# Patient Record
Sex: MALE | Race: WHITE | Employment: UNEMPLOYED | URBAN - METROPOLITAN AREA
[De-identification: names, ages, dates, MRNs, and addresses within clinical notes are randomized per-mention and may not be internally consistent; named-entity substitution may affect disease eponyms.]

---

## 2017-01-09 ENCOUNTER — MYC REFILL (OUTPATIENT)
Dept: PEDIATRICS | Facility: CLINIC | Age: 52
End: 2017-01-09

## 2017-01-09 DIAGNOSIS — Q85.01 NEUROFIBROMATOSIS, TYPE 1 (H): ICD-10-CM

## 2017-01-09 RX ORDER — MORPHINE SULFATE 15 MG/1
15 TABLET, FILM COATED, EXTENDED RELEASE ORAL 2 TIMES DAILY
Qty: 60 TABLET | Refills: 0 | Status: SHIPPED | OUTPATIENT
Start: 2017-01-12 | End: 2017-02-08

## 2017-01-09 NOTE — TELEPHONE ENCOUNTER
rx printed. Do they  at desk downstairs or upstairs? Please send mychart.    Perla Balderas MD  Internal Medicine/Pediatrics

## 2017-01-09 NOTE — TELEPHONE ENCOUNTER
Message from MyChart:  Original authorizing provider: MD Jarrell Pruett would like a refill of the following medications:  morphine (MS CONTIN) 15 MG 12 hr tablet [Perla Balderas MD]    Preferred pharmacy: [facility]    Comment:  need a refill on my morphine will pick-up at clinic on Friday 1/13/2017 thank you Jarrell lindo

## 2017-01-09 NOTE — TELEPHONE ENCOUNTER
MS Contin 15 mg      Last Written Prescription Date:  12/12/16  Last Fill Quantity: 60,   # refills: 0  [place] [facility] [place] [facility] or  Health prescribing provider: 12/21/16  Future Office visit:       Routing refill request to provider for review/approval because:  Drug not on the [facility] or  Health refill protocol or controlled substance    Controlled Substance Refill Request for MS Contin 15 mg  Problem List Complete:  Yes   checked in past 6 months?  No, route to RN    checked today-01/09/17.    Patient will  at  on Friday.  ANGEL Yousif RN

## 2017-02-08 ENCOUNTER — MYC REFILL (OUTPATIENT)
Dept: PEDIATRICS | Facility: CLINIC | Age: 52
End: 2017-02-08

## 2017-02-08 DIAGNOSIS — Q85.01 NEUROFIBROMATOSIS, TYPE 1 (H): ICD-10-CM

## 2017-02-08 RX ORDER — MORPHINE SULFATE 15 MG/1
15 TABLET, FILM COATED, EXTENDED RELEASE ORAL 2 TIMES DAILY
Qty: 60 TABLET | Refills: 0 | Status: SHIPPED | OUTPATIENT
Start: 2017-02-11 | End: 2017-03-09

## 2017-02-08 NOTE — TELEPHONE ENCOUNTER
Message from MyChart:  Original authorizing provider: MD Jarrell Pruett would like a refill of the following medications:  morphine (MS CONTIN) 15 MG 12 hr tablet [Perla Balderas MD]    Preferred pharmacy: [facility]    Comment:  need refill on my morphine, will pick-up at clinic, e-mail when ready Thank you Jarrell Pepe

## 2017-02-08 NOTE — TELEPHONE ENCOUNTER
MS Contin 15 mg      Last Written Prescription Date:  01/12/17  Last Fill Quantity: 60,   # refills: 0  Last Office Visit with [facility] or  Health prescribing provider: 12/21/16  Future Office visit:       Routing refill request to provider for review/approval because:  Drug not on the [facility], [facility] or [facility] refill protocol or controlled substance  Controlled Substance Refill Request for MS Contin 15 mg  Problem List Complete:  Yes   checked in past 6 months?  Yes 10/11/16     Please send My chart message when RX is ready to  at the clinic.  ANGEL Yousif RN

## 2017-03-09 ENCOUNTER — MYC REFILL (OUTPATIENT)
Dept: PEDIATRICS | Facility: CLINIC | Age: 52
End: 2017-03-09

## 2017-03-09 DIAGNOSIS — Q85.01 NEUROFIBROMATOSIS, TYPE 1 (H): ICD-10-CM

## 2017-03-09 RX ORDER — MORPHINE SULFATE 15 MG/1
15 TABLET, FILM COATED, EXTENDED RELEASE ORAL 2 TIMES DAILY
Qty: 60 TABLET | Refills: 0 | Status: SHIPPED | OUTPATIENT
Start: 2017-03-09 | End: 2017-04-09

## 2017-03-09 NOTE — TELEPHONE ENCOUNTER
MS contin      Last Written Prescription Date:  2-11-17  Last Fill Quantity: 60,   # refills: 0  [facility] provider: 12-21-16  Future Office visit:       Routing refill request to provider for review/approval because:  Drug not on the [facility] or [facility] refill protocol or controlled substance  Maximum use per month: 60  Expected duration: ongoing   Narcotic agreement on file: YES  Clinic visit recommended: Q 6 months  Ariadne Geronimo RN

## 2017-03-09 NOTE — TELEPHONE ENCOUNTER
Message from MyChart:  Original authorizing provider: MD Jarrell Pruett would like a refill of the following medications:  morphine (MS CONTIN) 15 MG 12 hr tablet [Perla Balderas MD]    Preferred pharmacy: [facility]    Comment:  need a refill on my morphine will pick-up at clinic when ready, e-mail me Thank you Jarrell Pepe

## 2017-04-09 ENCOUNTER — MYC REFILL (OUTPATIENT)
Dept: PEDIATRICS | Facility: CLINIC | Age: 52
End: 2017-04-09

## 2017-04-09 DIAGNOSIS — Q85.01 NEUROFIBROMATOSIS, TYPE 1 (H): ICD-10-CM

## 2017-04-10 RX ORDER — MORPHINE SULFATE 15 MG/1
15 TABLET, FILM COATED, EXTENDED RELEASE ORAL 2 TIMES DAILY
Qty: 60 TABLET | Refills: 0 | Status: SHIPPED | OUTPATIENT
Start: 2017-04-10 | End: 2017-05-08

## 2017-04-10 NOTE — TELEPHONE ENCOUNTER
Message from [employer]:  Original authorizing provider: MD Jarrell Courtney would like a refill of the following medications:  morphine (MS CONTIN) 15 MG 12 hr tablet [Shruthi Estrada MD]    Preferred pharmacy: [facility]    Comment:  Need a refill on my morphine will pick-up at clinic when ready. Thank you, Jarrell Pepe

## 2017-04-10 NOTE — TELEPHONE ENCOUNTER
Refill request for: MS CONTIN 15 MG BID    Last rx written: 3/9/17 # 60    **[place]  reviewed- last filled: 3/10/17  Narc agreement: #60 for 30 days  Last OV: 12/21/16  for Chronic Pain Syndrome    Unable to fill per standing order routed to preceptor for approval.    Place RX up front. Please send a Modify message when this has been done.     Problem List Complete:  Yes, please consider updating the dotphrase.   checked in past 6 months?  Yes 4/10/17    Oliva Mcguire RN

## 2017-05-08 ENCOUNTER — MYC REFILL (OUTPATIENT)
Dept: PEDIATRICS | Facility: CLINIC | Age: 52
End: 2017-05-08

## 2017-05-08 DIAGNOSIS — Q85.01 NEUROFIBROMATOSIS, TYPE 1 (H): ICD-10-CM

## 2017-05-08 RX ORDER — MORPHINE SULFATE 15 MG/1
15 TABLET, FILM COATED, EXTENDED RELEASE ORAL 2 TIMES DAILY
Qty: 60 TABLET | Refills: 0 | Status: SHIPPED | OUTPATIENT
Start: 2017-05-11 | End: 2017-06-07

## 2017-05-08 NOTE — TELEPHONE ENCOUNTER
[facility]  Original authorizing provider: MD Jarrell Courtney would like a refill of the following medications:  morphine (MS CONTIN) 15 MG 12 hr tablet [Shruthi Estrada MD]    Preferred pharmacy: [facility]    Comment:  need refill on my morphine will  at clinic, e-mail me when ready. thank you Jarrell Pepe

## 2017-05-08 NOTE — TELEPHONE ENCOUNTER
Due 5/12/17  Refill request for: MS CONTIN 15 MG BID    Last rx written: 4/10/17 # 60    **[place]  reviewed- last filled: 4/12/17  Narc agreement: #60 for 30 days  Last OV: 12/21/16  for Chronic Pain Syndrome    Unable to fill per standing order routed to preceptor for approval.    Place RX up front. Please send a Gyft message when this has been done.     Problem List Complete:  Yes, please consider updating the dotphrase.   checked in past 6 months?  Yes 5/8/17    Oliva Mcguire RN

## 2017-06-07 ENCOUNTER — MYC REFILL (OUTPATIENT)
Dept: PEDIATRICS | Facility: CLINIC | Age: 52
End: 2017-06-07

## 2017-06-07 DIAGNOSIS — Q85.01 NEUROFIBROMATOSIS, TYPE 1 (H): ICD-10-CM

## 2017-06-07 RX ORDER — MORPHINE SULFATE 15 MG/1
15 TABLET, FILM COATED, EXTENDED RELEASE ORAL 2 TIMES DAILY
Qty: 60 TABLET | Refills: 0 | Status: SHIPPED | OUTPATIENT
Start: 2017-06-11 | End: 2017-07-10

## 2017-06-07 NOTE — TELEPHONE ENCOUNTER
Message from MyChart:  Original authorizing provider: MD Jarrell Pruett would like a refill of the following medications:  morphine (MS CONTIN) 15 MG 12 hr tablet [Perla Balderas MD]    Preferred pharmacy: [facility]    Comment:  need re-fill on my morphine email me when ready, will pick-up at clinic. Thank you Jarrell Pepe

## 2017-06-07 NOTE — TELEPHONE ENCOUNTER
Due 6/11/17, changed start date for ok to fill on 6/9/17.   Refill request for: MS CONTIN 15 MG BID    Last rx written: 5/11/17 # 60    **[place]  reviewed- last filled: 5/11/17  Narc agreement: #60 for 30 days  Last OV: 12/21/16  for chronic pain    Unable to fill per standing order routed to Dr. Morfin for approval.    Place RX up front. Please send a [employer] message when this has been done.     Problem List Complete:  Yes, consider updating to chronic pain dotphrase   checked in past 6 months?  Yes 6/7/17    Oliva Mcguire, RN

## 2017-06-14 ENCOUNTER — OFFICE VISIT (OUTPATIENT)
Dept: PEDIATRICS | Facility: CLINIC | Age: 52
End: 2017-06-14
Payer: MEDICARE

## 2017-06-14 VITALS
SYSTOLIC BLOOD PRESSURE: 100 MMHG | HEIGHT: 60 IN | HEART RATE: 77 BPM | BODY MASS INDEX: 21.6 KG/M2 | TEMPERATURE: 99 F | DIASTOLIC BLOOD PRESSURE: 68 MMHG | WEIGHT: 110 LBS

## 2017-06-14 DIAGNOSIS — Z11.59 NEED FOR HEPATITIS C SCREENING TEST: ICD-10-CM

## 2017-06-14 DIAGNOSIS — G89.29 OTHER CHRONIC PAIN: ICD-10-CM

## 2017-06-14 DIAGNOSIS — E55.9 VITAMIN D DEFICIENCY: ICD-10-CM

## 2017-06-14 DIAGNOSIS — Z13.220 LIPID SCREENING: Primary | ICD-10-CM

## 2017-06-14 LAB
AMPHETAMINES UR QL: ABNORMAL NG/ML
BARBITURATES UR QL SCN: ABNORMAL NG/ML
BENZODIAZ UR QL SCN: ABNORMAL NG/ML
BUPRENORPHINE UR QL: ABNORMAL NG/ML
CANNABINOIDS UR QL: ABNORMAL NG/ML
COCAINE UR QL SCN: ABNORMAL NG/ML
D-METHAMPHET UR QL: ABNORMAL NG/ML
METHADONE UR QL SCN: ABNORMAL NG/ML
OPIATES UR QL SCN: ABNORMAL NG/ML
OXYCODONE UR QL SCN: ABNORMAL NG/ML
PCP UR QL SCN: ABNORMAL NG/ML
PROPOXYPH UR QL: ABNORMAL NG/ML
TRICYCLICS UR QL SCN: ABNORMAL NG/ML

## 2017-06-14 PROCEDURE — 99213 OFFICE O/P EST LOW 20 MIN: CPT | Mod: GE | Performed by: INTERNAL MEDICINE

## 2017-06-14 PROCEDURE — 80306 DRUG TEST PRSMV INSTRMNT: CPT | Performed by: INTERNAL MEDICINE

## 2017-06-14 NOTE — LETTER
[address]  [address]  [facility] [address]  [phone]      June 14, 2017      To Whom it may concern:    Jarrell Pepe has a medical condition that would benefit from increased activity taking care of a dog and I would hope an exception could be made to allow him to have a dog in the apartment.       Sincerely,    Tommie Mccray MD

## 2017-06-14 NOTE — NURSING NOTE
"No chief complaint on file.      Initial /68 (BP Location: Right arm, Patient Position: Chair, Cuff Size: Adult Regular)  Pulse 77  Temp 99  F (37.2  C) (Oral)  Ht 4' 11\" (1.499 m)  Wt 110 lb (49.9 kg)  BMI 22.22 kg/m2 Estimated body mass index is 22.22 kg/(m^2) as calculated from the following:    Height as of this encounter: 4' 11\" (1.499 m).    Weight as of this encounter: 110 lb (49.9 kg).  Medication Reconciliation: complete   Amarilis Brown MA      "

## 2017-06-14 NOTE — MR AVS SNAPSHOT
After Visit Summary   6/14/2017    Jarrell Pepe    MRN: [id number]           Patient Information     Date Of Birth          1965        Visit Information        Provider Department      6/14/2017 8:00 AM Tommie Torres MD [facility]        Today's Diagnoses     Lipid screening    -  1    Need for hepatitis C screening test        Vitamin D deficiency        Other chronic pain          Care Instructions    1. Follow up in 6 months.   2. Blood labs at that appointment. Urine today  3. If you have any issues you can email me.           Follow-ups after your visit        Future tests that were ordered for you today     Open Future Orders        Priority Expected Expires Ordered    Vitamin D Deficiency Routine 6/14/2018 6/14/2018 6/14/2017    Lipid panel reflex to direct LDL Routine  12/15/2017 6/14/2017    Hepatitis C antibody Routine 6/14/2018 6/14/2018 6/14/2017            Who to contact     If you have questions or need follow up information about today's clinic visit or your schedule please contact [facility] directly at [phone].  Normal or non-critical lab and imaging results will be communicated to you by Sanerahart, letter or phone within 4 business days after the clinic has received the results. If you do not hear from us within 7 days, please contact the clinic through ScaleMPt or phone. If you have a critical or abnormal lab result, we will notify you by phone as soon as possible.  Submit refill requests through YouBeauty or call your pharmacy and they will forward the refill request to us. Please allow 3 business days for your refill to be completed.          Additional Information About Your Visit        Sanerahart Information     YouBeauty gives you secure access to your electronic health record. If you see a primary care provider, you can also send messages to your care team and make appointments. If you have questions, please call your primary care  "clinic.  If you do not have a primary care provider, please call [phone] and they will assist you.        Care EveryWhere ID     This is your Care EveryWhere ID. This could be used by other organizations to access your [place] medical records  [id number]        Your Vitals Were     Pulse Temperature Height BMI (Body Mass Index)          77 99  F (37.2  C) (Oral) 4' 11\" (1.499 m) 22.22 kg/m2         Blood Pressure from Last 3 Encounters:   06/14/17 100/68   12/21/16 100/66   08/22/16 106/70    Weight from Last 3 Encounters:   06/14/17 110 lb (49.9 kg)   12/21/16 108 lb (49 kg)   08/22/16 105 lb (47.6 kg)              We Performed the Following     Drug Abuse Screen Panel 13, Urine (Pain Care Package)        Primary Care Provider Office Phone # Fax #    Tommie Torres [phone]       [facility] [address] [place] [address] [address] [place] [address]        Thank you!     Thank you for choosing [facility]  for your care. Our goal is always to provide you with excellent care. Hearing back from our patients is one way we can continue to improve our services. Please take a few minutes to complete the written survey that you may receive in the mail after your visit with us. Thank you!             Your Updated Medication List - Protect others around you: Learn how to safely use, store and throw away your medicines at www.disposemymeds.org.          This list is accurate as of: 6/14/17  8:41 AM.  Always use your most recent med list.                   Brand Name Dispense Instructions for use    cholecalciferol 1000 UNIT tablet    vitamin D    100 tablet    Take 1 tablet (1,000 Units) by mouth daily       COLACE 100 MG capsule   Generic drug:  docusate sodium      Take 100 mg by mouth daily       morphine 15 MG 12 hr tablet    MS CONTIN    60 tablet    Take 1 tablet (15 mg) by mouth 2 times daily         "

## 2017-06-14 NOTE — PROGRESS NOTES
SUBJECTIVE:                                                    Jarrell Pepe is a 51 year old male who presents to clinic today for the following health issues:      Medication Followup of  Morphine    Taking Medication as prescribed: yes    Side Effects:  None    Medication Helping Symptoms:  yes     He is doing quite well. His pain is well controlled and he is able to get out of the house which he was unable to do prior to initiation of MS Contin. No drug use, no alcohol use. No constipation, itching, other side effects. Does not need a refill today. We will get a UDS. He does not have any concerning neurofibromas that are causing him a lot of pain or discomfort. Preventative care is up to date other than UDS and hepatitis C.     Problem list and histories reviewed & adjusted, as indicated.  Additional history: as documented    Patient Active Problem List   Diagnosis     Dental caries     Neurofibromatosis, type 1 (H)     Scoliosis associated with other condition     Back pain     Hypertension     Pain of lumbar spine     Cervical spine degeneration     Vitamin D deficiency     Cervical spondylosis with myelopathy     Kyphoscoliosis deformity of spine     Chronic low back pain     Chronic pain     CARDIOVASCULAR SCREENING; LDL GOAL LESS THAN 160     Past Surgical History:   Procedure Laterality Date     COLONOSCOPY N/A 8/22/2016    Procedure: COMBINED COLONOSCOPY, SINGLE OR MULTIPLE BIOPSY/POLYPECTOMY BY BIOPSY;  Surgeon: Mariana Araujo MD;  Location:  GI     ENT SURGERY      tonsil and adnoids 1970's       Social History   Substance Use Topics     Smoking status: Former Smoker     Packs/day: 1.00     Years: 20.00     Types: Cigarettes     Quit date: 6/1/2013     Smokeless tobacco: Never Used     Alcohol use No     History reviewed. No pertinent family history.        Reviewed and updated as needed this visit by clinical staff  Tobacco  Allergies  Meds  Med Hx  Surg Hx  Fam Hx  Soc Hx     "    ROS:  Constitutional, HEENT, cardiovascular, pulmonary, gi and gu systems are negative, except as otherwise noted.    OBJECTIVE:                                                    /68 (BP Location: Right arm, Patient Position: Chair, Cuff Size: Adult Regular)  Pulse 77  Temp 99  F (37.2  C) (Oral)  Ht 4' 11\" (1.499 m)  Wt 110 lb (49.9 kg)  BMI 22.22 kg/m2  Body mass index is 22.22 kg/(m^2).  GENERAL: healthy, alert and no distress  RESP: lungs clear to auscultation - no rales, rhonchi or wheezes  CV: regular rate and rhythm, normal S1 S2, no S3 or S4, no murmur, click or rub, no peripheral edema and peripheral pulses strong  MS: no gross musculoskeletal defects noted, no edema  SKIN: innumerous scattered cutaneous neurofibromas, none bleeding or draining.     Diagnostic Test Results:  UDS pending      ASSESSMENT/PLAN:                                                    1. Lipid screening  2. Need for hepatitis C screening test  3. Vitamin D deficiency  Will check labs this winter with next appointment in about 6 months.   - Lipid panel reflex to direct LDL; Future  - Hepatitis C antibody; Future  - Vitamin D Deficiency; Future    4. Other chronic pain  Chronic back pain secondary to scoliosis from his NF1. Has been on stable dose of MSContin. Some risk for CKD with NF and have been intermittently checking his renal function while on MSContin. Will check Utox today.   - Drug Abuse Screen Panel 13, Urine (Pain Care Package)    See Patient Instructions. He will follow up with Dr. Perez going forward and they were introduced today.     Tommie Mccray MD  [facility]    I have discussed the patient with Dr. Mccray and agree with the jointly developed plan as documented above.    Bri Henderson MD  Internal Medicine-Pediatrics      "

## 2017-07-10 ENCOUNTER — MYC REFILL (OUTPATIENT)
Dept: PEDIATRICS | Facility: CLINIC | Age: 52
End: 2017-07-10

## 2017-07-10 DIAGNOSIS — Q85.01 NEUROFIBROMATOSIS, TYPE 1 (H): ICD-10-CM

## 2017-07-10 NOTE — TELEPHONE ENCOUNTER
Please inform patient when prescription is ready for  at [facility].    Morphine      Last Written Prescription Date:  6/11/2017  Last Fill Quantity: 60,   # refills: 0  Last Office Visit with [facility] or [facility] provider: 6/14/2017  Future Office visit:       Routing refill request to provider for review/approval because:  Drug not on the [facility], [facility] or [facility] refill protocol or controlled substance. Routing to last prescribing MD. Please advise if refill is still appropriate.       RX [place] reviewed:  reviewed- no concerns    Last fills:    6/11/2017, #60  5/11/2017, #60  4/12/2017, #60    [place]:  [email or web address]      Pema RAIN RN, BSN, PHN  [employer] RN

## 2017-07-10 NOTE — TELEPHONE ENCOUNTER
Message from [email or web address]:  Original authorizing provider: MD Jarrell Courtney would like a refill of the following medications:  morphine (MS CONTIN) 15 MG 12 hr tablet [Shruthi Estrada MD]    Preferred pharmacy: [facility]    Comment:  need refill on my morphine will pick-up at clinic, e-mail me when ready. Thank you , Jarrell Pepe

## 2017-07-11 RX ORDER — MORPHINE SULFATE 15 MG/1
15 TABLET, FILM COATED, EXTENDED RELEASE ORAL 2 TIMES DAILY
Qty: 60 TABLET | Refills: 0 | Status: SHIPPED | OUTPATIENT
Start: 2017-07-11 | End: 2017-08-07

## 2017-09-05 ENCOUNTER — MYC REFILL (OUTPATIENT)
Dept: PEDIATRICS | Facility: CLINIC | Age: 52
End: 2017-09-05

## 2017-09-05 DIAGNOSIS — Q85.01 NEUROFIBROMATOSIS, TYPE 1 (H): ICD-10-CM

## 2017-09-08 ENCOUNTER — MYC REFILL (OUTPATIENT)
Dept: PEDIATRICS | Facility: CLINIC | Age: 52
End: 2017-09-08

## 2017-09-08 DIAGNOSIS — Q85.01 NEUROFIBROMATOSIS, TYPE 1 (H): ICD-10-CM

## 2017-09-08 RX ORDER — MORPHINE SULFATE 15 MG/1
15 TABLET, FILM COATED, EXTENDED RELEASE ORAL 2 TIMES DAILY
Qty: 60 TABLET | Refills: 0 | Status: CANCELLED | OUTPATIENT
Start: 2017-09-08

## 2017-09-08 NOTE — TELEPHONE ENCOUNTER
Message from [employer]:  Original authorizing provider: MD Jarrell Courtney would like a refill of the following medications:  morphine (MS CONTIN) 15 MG 12 hr tablet [Shruthi Estrada MD]    Preferred pharmacy: [facility]    Comment:  need re-fill on my morphine e-mail me when ready to  at clinic thank you Jarrell Pepe

## 2017-09-08 NOTE — TELEPHONE ENCOUNTER
Please leave rx with [facility] when ready. Thanks.     MS Contin 15 mg bid      Last Written Prescription Date:  08/09/17  Last Fill Quantity: 60,   # refills: 0  Last Office Visit with [facility], [facility] or [facility] prescribing provider: 06/14/17  Future Office visit:       Routing refill request to provider for review/approval because:  Drug not on the [facility], [facility] or  [place] refill protocol or controlled substance    Abraham, RN  Triage Nurse

## 2017-09-08 NOTE — TELEPHONE ENCOUNTER
Message from [employer]:  Original authorizing provider: MD Jarrell Courtney would like a refill of the following medications:  morphine (MS CONTIN) 15 MG 12 hr tablet [Shruthi Estrada MD]    Preferred pharmacy: [facility] REQUEST[facility]    Comment:  not sure if you got my e-mail, I need a refill on my morphine please e-mail me when ready at  thank you Jarrell Pepe

## 2017-09-11 RX ORDER — MORPHINE SULFATE 15 MG/1
15 TABLET, FILM COATED, EXTENDED RELEASE ORAL 2 TIMES DAILY
Qty: 60 TABLET | Refills: 0 | Status: SHIPPED | OUTPATIENT
Start: 2017-09-11 | End: 2017-10-05

## 2017-10-05 ENCOUNTER — MYC REFILL (OUTPATIENT)
Dept: PEDIATRICS | Facility: CLINIC | Age: 52
End: 2017-10-05

## 2017-10-05 DIAGNOSIS — Q85.01 NEUROFIBROMATOSIS, TYPE 1 (H): ICD-10-CM

## 2017-10-05 RX ORDER — MORPHINE SULFATE 15 MG/1
15 TABLET, FILM COATED, EXTENDED RELEASE ORAL 2 TIMES DAILY
Qty: 60 TABLET | Refills: 0 | Status: SHIPPED | OUTPATIENT
Start: 2017-10-07 | End: 2018-02-05

## 2017-10-05 NOTE — TELEPHONE ENCOUNTER
Refill request for: MS CONTIN 15 MG BID    Last rx written: 9/11/17 # 60    **[place]  reviewed- last filled: 9/11/17  Narc agreement: #60 for 30 days  Last OV: 6/14/17  for lipid screening, f/u plan in 6 months    Unable to fill per standing order routed to Dr. Estrada for approval.    Place RX up front. Please reply to this MyChart message when this has been done.     Problem List Complete:  Yes, consider updating dotphrase   checked in past 6 months?  Yes 10/5/17    Oliva Tovar RN

## 2017-10-05 NOTE — TELEPHONE ENCOUNTER
Message from [employer]:  Original authorizing provider: MD Jarrell Courtney would like a refill of the following medications:  morphine (MS CONTIN) 15 MG 12 hr tablet [Shruthi Estrada MD]    Preferred pharmacy: [facility] REQUESTED    Comment:  need a refill on my morphine, e-mail me when ready to  at  thank you. Jarrell Pepe

## 2017-10-26 ENCOUNTER — OFFICE VISIT (OUTPATIENT)
Dept: PEDIATRICS | Facility: CLINIC | Age: 52
End: 2017-10-26
Payer: MEDICARE

## 2017-10-26 VITALS
WEIGHT: 105 LBS | TEMPERATURE: 98.1 F | SYSTOLIC BLOOD PRESSURE: 100 MMHG | HEART RATE: 75 BPM | BODY MASS INDEX: 20.62 KG/M2 | HEIGHT: 60 IN | DIASTOLIC BLOOD PRESSURE: 60 MMHG

## 2017-10-26 DIAGNOSIS — Q85.01 NEUROFIBROMATOSIS, TYPE 1 (VON RECKLINGHAUSEN'S DISEASE) (H): Primary | ICD-10-CM

## 2017-10-26 DIAGNOSIS — M41.55 OTHER SECONDARY SCOLIOSIS, THORACOLUMBAR REGION: ICD-10-CM

## 2017-10-26 DIAGNOSIS — G89.29 OTHER CHRONIC PAIN: ICD-10-CM

## 2017-10-26 DIAGNOSIS — Z00.00 ROUTINE GENERAL MEDICAL EXAMINATION AT A HEALTH CARE FACILITY: ICD-10-CM

## 2017-10-26 LAB

## 2017-10-26 PROCEDURE — 99396 PREV VISIT EST AGE 40-64: CPT | Mod: GC | Performed by: STUDENT IN AN ORGANIZED HEALTH CARE EDUCATION/TRAINING PROGRAM

## 2017-10-26 PROCEDURE — 80306 DRUG TEST PRSMV INSTRMNT: CPT | Performed by: INTERNAL MEDICINE

## 2017-10-26 RX ORDER — MORPHINE SULFATE 15 MG/1
15 TABLET, FILM COATED, EXTENDED RELEASE ORAL EVERY 12 HOURS
Qty: 60 TABLET | Refills: 0 | Status: SHIPPED | OUTPATIENT
Start: 2017-11-07 | End: 2017-12-07

## 2017-10-26 NOTE — NURSING NOTE
"Chief Complaint   Patient presents with     Physical       Initial /60 (Cuff Size: Adult Regular)  Pulse 75  Temp 98.1  F (36.7  C) (Oral)  Ht 4' 11\" (1.499 m)  Wt 105 lb (47.6 kg)  BMI 21.21 kg/m2 Estimated body mass index is 21.21 kg/(m^2) as calculated from the following:    Height as of this encounter: 4' 11\" (1.499 m).    Weight as of this encounter: 105 lb (47.6 kg).  Medication Reconciliation: aleida Wagner CMA    "

## 2017-10-26 NOTE — PROGRESS NOTES
SUBJECTIVE:   CC: Jarrell Pepe is an 51 year old male who presents for preventative health visit.     Physical   Annual:     Getting at least 3 servings of Calcium per day::  Yes    Bi-annual eye exam::  NO    Dental care twice a year::  NO    Sleep apnea or symptoms of sleep apnea::  None    Diet::  Regular (no restrictions)    Frequency of exercise::  4-5 days/week    Duration of exercise::  15-30 minutes    Taking medications regularly::  Yes    Medication side effects::  None    Additional concerns today::  YES    Issues to address today:  1. 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 Feels he gets very good pain control and has been on stable dose for some time. Able to complete ADLs and do the activities he wants to do with pain relief. No problems with constipation and no side effects he has noticed from taking morphine. Does not take more than two a day. Next refill is due in 2 weeks. Last UDS positive only for opiates. Would like refill when due.    2. NF 1. Follows up every 1-2 years with Peds heme/onc. Next appointment scheduled for 12/2017. No active issues to follow up.       Today's PHQ-2 Score:   PHQ-2 ( 1999 [employer]) 10/23/2017   Q1: Little interest or pleasure in doing things 0   Q2: Feeling down, depressed or hopeless 0   PHQ-2 Score 0   Q1: Little interest or pleasure in doing things Not at all   Q2: Feeling down, depressed or hopeless Not at all   PHQ-2 Score 0       Abuse: Current or Past(Physical, Sexual or Emotional)- No  Do you feel safe in your environment - Yes    Social History   Substance Use Topics     Smoking status: Former Smoker     Packs/day: 1.00     Years: 20.00     Types: Cigarettes     Quit date: 6/1/2013     Smokeless tobacco: Never Used     Alcohol use No     The patient does not drink >3 drinks per day nor >7 drinks per week.    Last PSA: No results found for: PSA    Reviewed orders with patient. Reviewed health  maintenance and updated orders accordingly - Yes  Labs reviewed in EPIC  BP Readings from Last 3 Encounters:   10/26/17 100/60   06/14/17 100/68   12/21/16 100/66    Wt Readings from Last 3 Encounters:   10/26/17 105 lb (47.6 kg)   06/14/17 110 lb (49.9 kg)   12/21/16 108 lb (49 kg)                  Patient Active Problem List   Diagnosis     Dental caries     Neurofibromatosis, type 1 (H)     Scoliosis associated with other condition     Back pain     Hypertension     Pain of lumbar spine     Cervical spine degeneration     Vitamin D deficiency     Cervical spondylosis with myelopathy     Kyphoscoliosis deformity of spine     Chronic low back pain     Chronic pain     CARDIOVASCULAR SCREENING; LDL GOAL LESS THAN 160     Past Surgical History:   Procedure Laterality Date     COLONOSCOPY N/A 8/22/2016    Procedure: COMBINED COLONOSCOPY, SINGLE OR MULTIPLE BIOPSY/POLYPECTOMY BY BIOPSY;  Surgeon: Mariana Araujo MD;  Location:  GI     ENT SURGERY      tonsil and adnoids 1970's       Social History   Substance Use Topics     Smoking status: Former Smoker     Packs/day: 1.00     Years: 20.00     Types: Cigarettes     Quit date: 6/1/2013     Smokeless tobacco: Never Used     Alcohol use No     No family history on file.      Current Outpatient Prescriptions   Medication Sig Dispense Refill     [START ON 11/7/2017] morphine (MS CONTIN) 15 MG 12 hr tablet Take 1 tablet (15 mg) by mouth every 12 hours maximum 2 tablet(s) per day 60 tablet 0     morphine (MS CONTIN) 15 MG 12 hr tablet Take 1 tablet (15 mg) by mouth 2 times daily 60 tablet 0     cholecalciferol (VITAMIN D) 1000 UNIT tablet Take 1 tablet (1,000 Units) by mouth daily 100 tablet 3     docusate sodium (COLACE) 100 MG capsule Take 100 mg by mouth daily       Allergies   Allergen Reactions     Contrast Dye      Nkda [No Known Drug Allergies]            Reviewed and updated as needed this visit by clinical staffTobacco  Allergies  Meds         Reviewed and  "updated as needed this visit by Provider        Past Medical History:   Diagnosis Date     Neurofibromatosis, type 1 (H) 11/12/2013     Scoliosis associated with other condition 11/12/2013      Past Surgical History:   Procedure Laterality Date     COLONOSCOPY N/A 8/22/2016    Procedure: COMBINED COLONOSCOPY, SINGLE OR MULTIPLE BIOPSY/POLYPECTOMY BY BIOPSY;  Surgeon: Mariana Araujo MD;  Location:  GI     ENT SURGERY      tonsil and adnoids 1970's       Review of Systems  C: NEGATIVE for fever, chills, change in weight  I: POSITIVE for skin manifestations of NF  E: NEGATIVE for vision changes or irritation  ENT: NEGATIVE for ear, mouth and throat problems  R: NEGATIVE for significant cough or SOB  CV: NEGATIVE for chest pain, palpitations or peripheral edema  GI: NEGATIVE for nausea, abdominal pain, heartburn, or change in bowel habits   male: negative   M: POSITIVE for back pain  N: NEGATIVE for weakness, dizziness or paresthesias  P: NEGATIVE for changes in mood or affect    OBJECTIVE:   /60 (Cuff Size: Adult Regular)  Pulse 75  Temp 98.1  F (36.7  C) (Oral)  Ht 4' 11\" (1.499 m)  Wt 105 lb (47.6 kg)  BMI 21.21 kg/m2    Physical Exam    General: awake, alert, in no acute distress, very pleasant, thin  HEENT: NCAT, PERRL, EOMI, sclera anicteric, no nasal discharge, MMM, posterior pharynx without erythema or exudates, no cervical lymphadenopathy  CV: RRR, no murmurs noted, peripheral pulses strong  Resp: CTAB, no increased WOB  Abd: Soft, nontender, nondistended, +BS, no rebound or guarding  MSK: No peripheral edema, extremities warm and well perfused, normal pulses, significant scoliosis of back  Skin: warm, dry, no jaundice. Numerous diffuse neurofibromas.  Neuro: CN II-XII grossly intact. No focal deficits. Alert and oriented x4.      ASSESSMENT/PLAN:   1. Neurofibromatosis, type 1 (von Recklinghausen's disease) (H)  2. Other chronic pain  Chronic pain related to scoliosis and tumor from NF1. " "Stable on current dose of morphine.  - morphine (MS CONTIN) 15 MG 12 hr tablet; Take 1 tablet (15 mg) by mouth every 12 hours maximum 2 tablet(s) per day  Dispense: 60 tablet; Refill: 0 - Paper Rx given, forward dated for 11/7 for refill.   - F/up with Peds Heme/Onc as scheduled 12/2017  - Drug Abuse Screen Panel 13, Urine (Pain Care Package) - positive only for opiates as expected    3. Other secondary scoliosis, thoracolumbar region  - morphine (MS CONTIN) 15 MG 12 hr tablet; Take 1 tablet (15 mg) by mouth every 12 hours maximum 2 tablet(s) per day  Dispense: 60 tablet; Refill: 0    4. Routine general medical examination at a health care facility  - ZOSTER VACC LIVE SUBQ NJX - ordered, patient will go down to pharmacy and see if covered by Medicare as he is younger than age 60. If not covered, he will wait until he turns 60 to get the vaccine.  - Does not want flu shot today  - Basic metabolic panel; Future  Other labs ordered from last visit include lipid panel, Hep C, and vitamin D.   He is not fasting today so will wait until his next visit to get these labs and he will be fasting at that time.   - Otherwise UTD on colonoscopy (last 8/2016, needs repeat in 5 years)    COUNSELING:   Reviewed preventive health counseling, as reflected in patient instructions  Special attention given to:        Regular exercise       Healthy diet/nutrition       Immunizations    Would like vaccination for zoster but will wait until 60 if not covered by Medicare now.     Declined flu shot.         Consider Hep C screening for patients born between 1945 and 1965 - will get at next lab draw             reports that he quit smoking about 4 years ago. His smoking use included Cigarettes. He has a 20.00 pack-year smoking history. He has never used smokeless tobacco.      Estimated body mass index is 21.21 kg/(m^2) as calculated from the following:    Height as of this encounter: 4' 11\" (1.499 m).    Weight as of this encounter: 105 lb " (47.6 kg).         Counseling Resources:  ATP IV Guidelines  Pooled Cohorts Equation Calculator  FRAX Risk Assessment  ICSI Preventive Guidelines  Dietary Guidelines for Americans, 2010  USDA's MyPlate  ASA Prophylaxis  Lung CA Screening    F/up in clinic in 6 months.       Patient was seen and discussed with attending, Dr Ryne Rosenbaum, who agrees with the above assessment and plan.    Caroline Ames MD  PGY - 2   Internal Medicine/Pediatrics  Pager [phone]    ===========  STAFF NOTE:  Patient seen with resident physician today.  I was physically present during key portions of the visit and participated in the evaluation and management of the patient today.     Juanpablo Rosenbaum MD

## 2017-10-26 NOTE — MR AVS SNAPSHOT
After Visit Summary   10/26/2017    Jarrell Pepe    MRN: [id number]           Patient Information     Date Of Birth          1965        Visit Information        Provider Department      10/26/2017 8:30 AM Keon Ames MD [facility]        Today's Diagnoses     Neurofibromatosis, type 1 (von Recklinghausen's disease) (H)    -  1    Other chronic pain        Other secondary scoliosis, thoracolumbar region        Routine general medical examination at a health care facility          Care Instructions    1. Morphine prescription given starting 11/7/17. Urine drug test today.  2. Vaccines: shingles vaccine is recommend at age 60 but OK to get after the age of 50. May be covered by Medicare. I'll send you down to pharmacy to see if it is covered by your insurance and they can give it. If you change your mind about the flu shot we can give it any time!  3. Labs: you are due for getting your lipid panel, vitamin D, and hepatitis C checked. We will also check your kidney function. I'll send you down to lab for this and you can see the results in MyChart.  4. Your last colonoscopy was in August 2016 - repeat in 5 years.   4. Finally, your appointment with Peds Heme/Onc is 12/6/2017. I suggest you give them a call at  to find out where your appointment is with Oxana Chan NP.  5. We'll see you back in 6 months!    Preventive Health Recommendations  Male Ages 50 - 64    Yearly exam:             See your health care provider every year in order to  o   Review health changes.   o   Discuss preventive care.    o   Review your medicines if your doctor has prescribed any.     Have a cholesterol test every 5 years, or more frequently if you are at risk for high cholesterol/heart disease.     Have a diabetes test (fasting glucose) every three years. If you are at risk for diabetes, you should have this test more often.     Have a colonoscopy at age 50, or have a yearly FIT  test (stool test). These exams will check for colon cancer.      Talk with your health care provider about whether or not a prostate cancer screening test (PSA) is right for you.    You should be tested each year for STDs (sexually transmitted diseases), if you re at risk.     Shots: Get a flu shot each year. Get a tetanus shot every 10 years.     Nutrition:    Eat at least 5 servings of fruits and vegetables daily.     Eat whole-grain bread, whole-wheat pasta and brown rice instead of white grains and rice.     Talk to your provider about Calcium and Vitamin D.     Lifestyle    Exercise for at least 150 minutes a week (30 minutes a day, 5 days a week). This will help you control your weight and prevent disease.     Limit alcohol to one drink per day.     No smoking.     Wear sunscreen to prevent skin cancer.     See your dentist every six months for an exam and cleaning.     See your eye doctor every 1 to 2 years.            Follow-ups after your visit        Your next 10 appointments already scheduled     Dec 06, 2017  8:45 AM CST   Return Visit with DEEDEE Gamino CNP Hematology Oncology ([facility])    [facility]  9th Floor  [address] [address] [phone]              Who to contact     If you have questions or need follow up information about today's clinic visit or your schedule please contact [facility] directly at [phone].  Normal or non-critical lab and imaging results will be communicated to you by MyChart, letter or phone within 4 business days after the clinic has received the results. If you do not hear from us within 7 days, please contact the clinic through MyChart or phone. If you have a critical or abnormal lab result, we will notify you by phone as soon as possible.  Submit refill requests through [employer] or call your pharmacy and they will forward the refill request to us. Please allow 3 business days for your  "refill to be completed.          Additional Information About Your Visit        LabfolderharorderTalk Information     [employer] gives you secure access to your electronic health record. If you see a primary care provider, you can also send messages to your care team and make appointments. If you have questions, please call your primary care clinic.  If you do not have a primary care provider, please call [phone] and they will assist you.        Care EveryWhere ID     This is your Care EveryWhere ID. This could be used by other organizations to access your [facility] medical records  [id number]        Your Vitals Were     Pulse Temperature Height BMI (Body Mass Index)          75 98.1  F (36.7  C) (Oral) 4' 11\" (1.499 m) 21.21 kg/m2         Blood Pressure from Last 3 Encounters:   10/26/17 100/60   06/14/17 100/68   12/21/16 100/66    Weight from Last 3 Encounters:   10/26/17 105 lb (47.6 kg)   06/14/17 110 lb (49.9 kg)   12/21/16 108 lb (49 kg)              We Performed the Following     Basic metabolic panel  (Ca, Cl, CO2, Creat, Gluc, K, Na, BUN)     Drug Abuse Screen Panel 13, Urine (Pain Care Package)     ZOSTER VACC LIVE SUBQ NJX          Today's Medication Changes          These changes are accurate as of: 10/26/17  9:19 AM.  If you have any questions, ask your nurse or doctor.               These medicines have changed or have updated prescriptions.        Dose/Directions    * morphine 15 MG 12 hr tablet   Commonly known as:  MS CONTIN   This may have changed:  Another medication with the same name was added. Make sure you understand how and when to take each.   Used for:  Neurofibromatosis, type 1 (H)   Changed by:  Shruthi Estrada MD        Dose:  15 mg   Take 1 tablet (15 mg) by mouth 2 times daily   Quantity:  60 tablet   Refills:  0       * morphine 15 MG 12 hr tablet   Commonly known as:  MS CONTIN   This may have changed:  You were already taking a medication with the same name, and this prescription was " added. Make sure you understand how and when to take each.   Used for:  Neurofibromatosis, type 1 (von Recklinghausen's disease) (H), Other chronic pain, Other secondary scoliosis, thoracolumbar region   Changed by:  Keon Ames MD        Dose:  15 mg   Start taking on:  11/7/2017   Take 1 tablet (15 mg) by mouth every 12 hours maximum 2 tablet(s) per day   Quantity:  60 tablet   Refills:  0       * Notice:  This list has 2 medication(s) that are the same as other medications prescribed for you. Read the directions carefully, and ask your doctor or other care provider to review them with you.         Where to get your medicines      Some of these will need a paper prescription and others can be bought over the counter.  Ask your nurse if you have questions.     Bring a paper prescription for each of these medications     morphine 15 MG 12 hr tablet                Primary Care Provider Office Phone # Fax #    Neli Perez [phone]       [address] [address]        Equal Access to Services     [facility] : Hadii lynda ku hadasho Soomaali, waaxda luqadaha, qaybta kaalmada adeegyada, janie monzon . So [facility] [phone].    ATENCIÓN: Si habla español, tiene a martinez disposición servicios gratuitos de asistencia lingüística. Llame al [phone].    We comply with applicable federal civil rights laws and [place] laws. We do not discriminate on the basis of race, color, national origin, age, disability, sex, sexual orientation, or gender identity.            Thank you!     Thank you for choosing [facility]  for your care. Our goal is always to provide you with excellent care. Hearing back from our patients is one way we can continue to improve our services. Please take a few minutes to complete the written survey that you may receive in the mail after your visit with us. Thank you!             Your Updated  Medication List - Protect others around you: Learn how to safely use, store and throw away your medicines at www.disposemymeds.org.          This list is accurate as of: 10/26/17  9:19 AM.  Always use your most recent med list.                   Brand Name Dispense Instructions for use Diagnosis    cholecalciferol 1000 UNIT tablet    vitamin D3    100 tablet    Take 1 tablet (1,000 Units) by mouth daily    Vitamin D deficiency       COLACE 100 MG capsule   Generic drug:  docusate sodium      Take 100 mg by mouth daily        * morphine 15 MG 12 hr tablet    MS CONTIN    60 tablet    Take 1 tablet (15 mg) by mouth 2 times daily    Neurofibromatosis, type 1 (H)       * morphine 15 MG 12 hr tablet   Start taking on:  11/7/2017    MS CONTIN    60 tablet    Take 1 tablet (15 mg) by mouth every 12 hours maximum 2 tablet(s) per day    Neurofibromatosis, type 1 (von Recklinghausen's disease) (H), Other chronic pain, Other secondary scoliosis, thoracolumbar region       * Notice:  This list has 2 medication(s) that are the same as other medications prescribed for you. Read the directions carefully, and ask your doctor or other care provider to review them with you.

## 2017-10-26 NOTE — PATIENT INSTRUCTIONS
1. Morphine prescription given starting 11/7/17. Urine drug test today.  2. Vaccines: shingles vaccine is recommend at age 60 but OK to get after the age of 50. May be covered by Medicare. I'll send you down to pharmacy to see if it is covered by your insurance and they can give it. If you change your mind about the flu shot we can give it any time!  3. Labs: you are due for getting your lipid panel, vitamin D, and hepatitis C checked. We will also check your kidney function. I'll send you down to lab for this and you can see the results in MyChart.  4. Your last colonoscopy was in August 2016 - repeat in 5 years.   4. Finally, your appointment with Peds Heme/Onc is 12/6/2017. I suggest you give them a call at  to find out where your appointment is with Oxana Chan NP.  5. We'll see you back in 6 months!    Preventive Health Recommendations  Male Ages 50 - 64    Yearly exam:             See your health care provider every year in order to  o   Review health changes.   o   Discuss preventive care.    o   Review your medicines if your doctor has prescribed any.     Have a cholesterol test every 5 years, or more frequently if you are at risk for high cholesterol/heart disease.     Have a diabetes test (fasting glucose) every three years. If you are at risk for diabetes, you should have this test more often.     Have a colonoscopy at age 50, or have a yearly FIT test (stool test). These exams will check for colon cancer.      Talk with your health care provider about whether or not a prostate cancer screening test (PSA) is right for you.    You should be tested each year for STDs (sexually transmitted diseases), if you re at risk.     Shots: Get a flu shot each year. Get a tetanus shot every 10 years.     Nutrition:    Eat at least 5 servings of fruits and vegetables daily.     Eat whole-grain bread, whole-wheat pasta and brown rice instead of white grains and rice.     Talk to your provider about  Calcium and Vitamin D.     Lifestyle    Exercise for at least 150 minutes a week (30 minutes a day, 5 days a week). This will help you control your weight and prevent disease.     Limit alcohol to one drink per day.     No smoking.     Wear sunscreen to prevent skin cancer.     See your dentist every six months for an exam and cleaning.     See your eye doctor every 1 to 2 years.

## 2017-12-06 ENCOUNTER — OFFICE VISIT (OUTPATIENT)
Dept: PEDIATRIC HEMATOLOGY/ONCOLOGY | Facility: CLINIC | Age: 52
End: 2017-12-06
Attending: NURSE PRACTITIONER
Payer: MEDICARE

## 2017-12-06 VITALS
HEART RATE: 83 BPM | SYSTOLIC BLOOD PRESSURE: 116 MMHG | TEMPERATURE: 98.3 F | WEIGHT: 107.36 LBS | HEIGHT: 60 IN | OXYGEN SATURATION: 99 % | RESPIRATION RATE: 16 BRPM | DIASTOLIC BLOOD PRESSURE: 81 MMHG | BODY MASS INDEX: 21.08 KG/M2

## 2017-12-06 DIAGNOSIS — M54.50 CHRONIC LEFT-SIDED LOW BACK PAIN WITHOUT SCIATICA: ICD-10-CM

## 2017-12-06 DIAGNOSIS — G89.29 CHRONIC LEFT-SIDED LOW BACK PAIN WITHOUT SCIATICA: ICD-10-CM

## 2017-12-06 DIAGNOSIS — Q85.01 NEUROFIBROMATOSIS, TYPE 1 (H): Primary | ICD-10-CM

## 2017-12-06 DIAGNOSIS — M41.85 OTHER FORM OF SCOLIOSIS OF THORACOLUMBAR SPINE: ICD-10-CM

## 2017-12-06 PROCEDURE — 99213 OFFICE O/P EST LOW 20 MIN: CPT | Mod: ZF

## 2017-12-06 NOTE — MR AVS SNAPSHOT
After Visit Summary   12/6/2017    Jarrell Pepe    MRN: [id number]           Patient Information     Date Of Birth          1965        Visit Information        Provider Department      12/6/2017 8:45 AM Oxana Chan APRN CNP Peds Hematology Oncology            [facility], 9th floor  [address] [place] [address]  Phone: [phone]  Clinic Hours:   Monday-Friday:   7 am to 5:00 pm   closed weekends and major  holidays     If your fever is 100.5  or greater,   call the clinic during business hours.   After hours call [phone] and ask for the pediatric hematology / oncology physician to be paged for you.              Care Instructions    It was a pleasure to see you in our Neurofibromatosis clinic today.  Here's our recommendations for follow-up care:    Referrals/Tests:  None today.    Other Instructions:    Influenza vaccine every year in the fall is recommended.    Ophthalmology (eye MD) exam every 1-2 years.    Physical exam every 6 months with your Primary Care Provider while taking daily MS Contin.    Return to Clinic:  2 years, or sooner if needed.    ------------------------------------------------------------------------------------------------------------------------------    Neurofibromatosis (NF) Clinic  [facility], 9th Floor - [address] [address]  Scheduling/Appointments: [phone]  Fax: [phone]    Numbers to call:   Monday - Friday, 8:00 am - 5:00 pm:    Non-urgent or same-day call-back concerns: [facility] Nurse Triage - Voicemail: [phone]    Urgent concerns: NF Care Coordinator - Shobha Mcnally RN - Pager: [phone] (If you don't get a call back within 15-30 minutes, then Shobha is off and you should call [phone]).    Scheduling/Appointments: [phone]  Nights and weekends:   Call [phone] and  ask the  to page the 'Pediatric Heme/Onc fellow on call' if you have an urgent concern that can't wait until the clinic opens.    Shobha Mcnally RN, MS  NF Care Coordinator  Pager: [phone]  E-mail: [email or web address]                  Follow-ups after your visit        Follow-up notes from your care team     Return in about 2 years (around 12/10/2019) for  Scheduling, Set Up Recall Appt, Hem/Onc Clinic, NF1 Follow-Up.      Who to contact     Please call your clinic at [phone] to:    Ask questions about your health    Make or cancel appointments    Discuss your medicines    Learn about your test results    Speak to your doctor   If you have compliments or concerns about an experience at your clinic, or if you wish to file a complaint, please contact [facility] Patient Relations at [phone] or email us at [email or web address]         Additional Information About Your Visit        MyChart Information     [employer] gives you secure access to your electronic health record. If you see a primary care provider, you can also send messages to your care team and make appointments. If you have questions, please call your primary care clinic.  If you do not have a primary care provider, please call [phone] and they will assist you.      [employer] is an electronic gateway that provides easy, online access to your medical records. With [employer], you can request a clinic appointment, read your test results, renew a prescription or communicate with your care team.     To access your existing account, please contact your [facility] or call [phone] for assistance.        Care EveryWhere ID     This is your Care EveryWhere ID. This could be used by other organizations to access your [facility] medical records  [id number]        Your Vitals Were     Pulse Temperature Respirations Height Pulse Oximetry BMI (Body Mass Index)  "   83 98.3  F (36.8  C) (Oral) 16 1.478 m (4' 10.19\") 99% 22.29 kg/m2       Blood Pressure from Last 3 Encounters:   12/06/17 116/81   10/26/17 100/60   06/14/17 100/68    Weight from Last 3 Encounters:   12/06/17 48.7 kg (107 lb 5.8 oz)   10/26/17 47.6 kg (105 lb)   06/14/17 49.9 kg (110 lb)              Today, you had the following     No orders found for display       Primary Care Provider Office Phone # Fax #    Neli Perez [phone]       [address] [address]        Equal Access to Services     JUAN GUO : Aretha deal Sojaswant, waaxda luqadaha, qaybta kaalmada adeegyada, janie monzon . So [facility] [phone].    ATENCIÓN: Si habla español, tiene a martinez disposición servicios gratuitos de asistencia lingüística. Llame al [phone].    We comply with applicable federal civil rights laws and [place] laws. We do not discriminate on the basis of race, color, national origin, age, disability, sex, sexual orientation, or gender identity.            Thank you!     Thank you for choosing [facility] HEMAT[facility]  for your care. Our goal is always to provide you with excellent care. Hearing back from our patients is one way we can continue to improve our services. Please take a few minutes to complete the written survey that you may receive in the mail after your visit with us. Thank you!             Your Updated Medication List - Protect others around you: Learn how to safely use, store and throw away your medicines at www.disposemymeds.org.          This list is accurate as of: 12/6/17 10:08 AM.  Always use your most recent med list.                   Brand Name Dispense Instructions for use Diagnosis    cholecalciferol 1000 UNIT tablet    vitamin D3    100 tablet    Take 1 tablet (1,000 Units) by mouth daily    Vitamin D deficiency       COLACE 100 MG capsule   Generic drug:  docusate sodium      Take 100 mg by " mouth daily        * morphine 15 MG 12 hr tablet    MS CONTIN    60 tablet    Take 1 tablet (15 mg) by mouth 2 times daily    Neurofibromatosis, type 1 (H)       * morphine 15 MG 12 hr tablet    MS CONTIN    60 tablet    Take 1 tablet (15 mg) by mouth every 12 hours maximum 2 tablet(s) per day    Neurofibromatosis, type 1 (von Recklinghausen's disease) (H), Other chronic pain, Other secondary scoliosis, thoracolumbar region       * Notice:  This list has 2 medication(s) that are the same as other medications prescribed for you. Read the directions carefully, and ask your doctor or other care provider to review them with you.

## 2017-12-06 NOTE — PATIENT INSTRUCTIONS
It was a pleasure to see you in our Neurofibromatosis clinic today.  Here's our recommendations for follow-up care:    Referrals/Tests:  None today.    Other Instructions:    Influenza vaccine every year in the fall is recommended.    Ophthalmology (eye MD) exam every 1-2 years.    Physical exam every 6 months with your Primary Care Provider while taking daily MS Contin.    Return to Clinic:  2 years, or sooner if needed.    ------------------------------------------------------------------------------------------------------------------------------    Neurofibromatosis (NF) Clinic  [facility], 9th Floor - [address] [address]  Scheduling/Appointments: [phone]  Fax: [phone]    Numbers to call:   Monday - Friday, 8:00 am - 5:00 pm:    Non-urgent or same-day call-back concerns: [facility] Nurse Triage - Voicemail: [phone]    Urgent concerns: NF Care Coordinator - Shobha Mcnally RN - Pager: [phone] (If you don't get a call back within 15-30 minutes, then Shobha is off and you should call [phone]).    Scheduling/Appointments: [phone]  Nights and weekends:   Call [phone] and ask the  to page the 'Pediatric Heme/Onc fellow on call' if you have an urgent concern that can't wait until the clinic opens.    Shobha Mcnally RN, MS  NF Care Coordinator  Pager: [phone]  E-mail: [email or web address]

## 2017-12-06 NOTE — LETTER
12/6/2017      RE: Jarrell Pepe  [address]   [facility] [address]          Pediatric Hematology/Oncology Clinic Note    CC: Jarrell Pepe is a 50 year old male who presents to clinic with his wife for follow up NF1 evaluation.    HPI:   Jarrell reports that his back pain has increased some with the colder weather.  He wears a long jacket and doesn't go outside that much but he notes increased lower back pain with weather changes. Jarrell has been following up with a , and has new dentures. Jarrell denies any headaches, new skin issues, or vision/hearing issues. He currently takes 15mg of morphine (MS Contin) bid, and uses ibuprofen for breakthrough pain, which rarely happens. Only needs that about twice a month.  That has been a very stable dose for him.  Salonpas Lidoderm patches are helping. He additionally continues to take Vitamin D and Colace. Jarrell continues to see the [facility] for pain and general healthcare management.   Fam/Soc: Jarrell enjoys fishing with his brother on [email or web address].. His wife currently works at the [employer] in [place]. He and his wife met on facebook and have been  for 5 years.     History was obtained from Jarrell and his wife.    Allergies as of 12/06/2017 - Fredo as Reviewed 12/06/2017   Allergen Reaction Noted     Contrast dye  11/12/2013     Nkda [no known drug allergies]  11/12/2013       Current Outpatient Prescriptions   Medication Sig Dispense Refill     morphine (MS CONTIN) 15 MG 12 hr tablet Take 1 tablet (15 mg) by mouth every 12 hours maximum 2 tablet(s) per day 60 tablet 0     morphine (MS CONTIN) 15 MG 12 hr tablet Take 1 tablet (15 mg) by mouth 2 times daily 60 tablet 0     cholecalciferol (VITAMIN D) 1000 UNIT tablet Take 1 tablet (1,000 Units) by mouth daily 100 tablet 3     docusate sodium (COLACE) 100 MG capsule Take 100 mg by mouth daily         Past Medical History:   Diagnosis Date     Neurofibromatosis, type 1 (H)  "11/12/2013     Scoliosis associated with other condition 11/12/2013       Social History     Social History     Marital status:      Spouse name: N/A     Number of children: N/A     Years of education: N/A     Occupational History     Not on file.     Social History Main Topics     Smoking status: Former Smoker     Packs/day: 1.00     Years: 20.00     Types: Cigarettes     Quit date: 6/1/2013     Smokeless tobacco: Never Used     Alcohol use No     Drug use: No     Sexual activity: Not on file     Other Topics Concern     Not on file     Social History Narrative       No family history on file.    ROS  See HPI. Complete ROS otherwise negative.  Review Of Systems  Skin:  Multiple neurofibromas.  Eyes: No new vision changes  Ears/Nose/Throat: No fevers or cold symptoms.    Respiratory: No shortness of breath, dyspnea on exertion, cough, or hemoptysis  Cardiovascular: negative for  Gastrointestinal: Normal stool in the mornings with twice daily colace.    Genitourinary: negative.  Voiding normally  Musculoskeletal:  Severe scoliosis - he is not pursuing surgery. Pain continues in lower legs  Neurologic: negative and headaches  Psychiatric: negative  Hematologic/Lymphatic/Immunologic: negative  Endocrine: negative      /81 (BP Location: Right arm, Patient Position: Fowlers, Cuff Size: Adult Small)  Pulse 83  Temp 98.3  F (36.8  C) (Oral)  Resp 16  Ht 1.478 m (4' 10.19\")  Wt 48.7 kg (107 lb 5.8 oz)  SpO2 99%  BMI 22.29 kg/m2     Wt Readings from Last 4 Encounters:   12/06/17 48.7 kg (107 lb 5.8 oz)   10/26/17 47.6 kg (105 lb)   06/14/17 49.9 kg (110 lb)   12/21/16 49 kg (108 lb)       Physical Exam   Constitutional: He is oriented to person, place, and time and well-developed, well-nourished, and in no distress.   HENT:   Head: Normocephalic.   Right Ear: External ear normal.   Left Ear: External ear normal.   Nose: Nose normal.   Mouth/Throat: Oropharynx is clear and moist.   Eyes: Conjunctivae and " EOM are normal. Pupils are equal, round, and reactive to light.   Lisch nodules OU   Neck: Normal range of motion. Neck supple. No thyromegaly present.   Cardiovascular: Normal rate, regular rhythm and normal heart sounds.    Pulmonary/Chest: Effort normal and breath sounds normal. No respiratory distress.   Musculoskeletal: Normal range of motion. He exhibits deformity (Cannot lay flat due to scoliosis. ). He exhibits no edema.   Severe scoliosis noted.    Lymphadenopathy:     He has no cervical adenopathy.   Neurological: He is alert and oriented to person, place, and time. No cranial nerve deficit. Coordination normal. GCS score is 15.   Skin: Skin is warm and dry.   TMTC dermal neurofibromas - many of significant size with pedunculation. Lower extremity with some freckling.  None groin or axillary areas.  Left cheek nevi.    Psychiatric: Mood, memory, affect and judgment normal.       Impression:  1. NF1  2. Scoliosis  3. No current skin concerns except dryness.  4. Current pain regimen providing adequate control  5. HTN in better control. Weight is fairly stable.      Plan:  1. RTC 2 years or prn  2. Ongoing follow up  3. He will speak with dermatology about our NF skin study.       Oxana Chan, DEEDEE CNP

## 2017-12-06 NOTE — PROGRESS NOTES
Pediatric Hematology/Oncology Clinic Note    CC: Jarrell Pepe is a 50 year old male who presents to clinic with his wife for follow up NF1 evaluation.    HPI:   Jarrell reports that his back pain has increased some with the colder weather.  He wears a long jacket and doesn't go outside that much but he notes increased lower back pain with weather changes. Jarrell has been following up with a , and has new dentures. Jarrell denies any headaches, new skin issues, or vision/hearing issues. He currently takes 15mg of morphine (MS Contin) bid, and uses ibuprofen for breakthrough pain, which rarely happens. Only needs that about twice a month.  That has been a very stable dose for him.  Salonpas Lidoderm patches are helping. He additionally continues to take Vitamin D and Colace. Jarrell continues to see [facility] for pain and general healthcare management.   Fam/Soc: Jarrell enjoys fishing with his brother on [employer]. His wife currently works at the [employer] in [place]. He and his wife met on facebook and have been  for 5 years.     History was obtained from Jarrell and his wife.    Allergies as of 12/06/2017 - Fredo as Reviewed 12/06/2017   Allergen Reaction Noted     Contrast dye  11/12/2013     Nkda [no known drug allergies]  11/12/2013       Current Outpatient Prescriptions   Medication Sig Dispense Refill     morphine (MS CONTIN) 15 MG 12 hr tablet Take 1 tablet (15 mg) by mouth every 12 hours maximum 2 tablet(s) per day 60 tablet 0     morphine (MS CONTIN) 15 MG 12 hr tablet Take 1 tablet (15 mg) by mouth 2 times daily 60 tablet 0     cholecalciferol (VITAMIN D) 1000 UNIT tablet Take 1 tablet (1,000 Units) by mouth daily 100 tablet 3     docusate sodium (COLACE) 100 MG capsule Take 100 mg by mouth daily         Past Medical History:   Diagnosis Date     Neurofibromatosis, type 1 (H) 11/12/2013     Scoliosis associated with other condition 11/12/2013       Social  "History     Social History     Marital status:      Spouse name: N/A     Number of children: N/A     Years of education: N/A     Occupational History     Not on file.     Social History Main Topics     Smoking status: Former Smoker     Packs/day: 1.00     Years: 20.00     Types: Cigarettes     Quit date: 6/1/2013     Smokeless tobacco: Never Used     Alcohol use No     Drug use: No     Sexual activity: Not on file     Other Topics Concern     Not on file     Social History Narrative       No family history on file.    ROS  See HPI. Complete ROS otherwise negative.  Review Of Systems  Skin:  Multiple neurofibromas.  Eyes: No new vision changes  Ears/Nose/Throat: No fevers or cold symptoms.    Respiratory: No shortness of breath, dyspnea on exertion, cough, or hemoptysis  Cardiovascular: negative for  Gastrointestinal: Normal stool in the mornings with twice daily colace.    Genitourinary: negative.  Voiding normally  Musculoskeletal:  Severe scoliosis - he is not pursuing surgery. Pain continues in lower legs  Neurologic: negative and headaches  Psychiatric: negative  Hematologic/Lymphatic/Immunologic: negative  Endocrine: negative      /81 (BP Location: Right arm, Patient Position: Fowlers, Cuff Size: Adult Small)  Pulse 83  Temp 98.3  F (36.8  C) (Oral)  Resp 16  Ht 1.478 m (4' 10.19\")  Wt 48.7 kg (107 lb 5.8 oz)  SpO2 99%  BMI 22.29 kg/m2     Wt Readings from Last 4 Encounters:   12/06/17 48.7 kg (107 lb 5.8 oz)   10/26/17 47.6 kg (105 lb)   06/14/17 49.9 kg (110 lb)   12/21/16 49 kg (108 lb)       Physical Exam   Constitutional: He is oriented to person, place, and time and well-developed, well-nourished, and in no distress.   HENT:   Head: Normocephalic.   Right Ear: External ear normal.   Left Ear: External ear normal.   Nose: Nose normal.   Mouth/Throat: Oropharynx is clear and moist.   Eyes: Conjunctivae and EOM are normal. Pupils are equal, round, and reactive to light.   Lisch nodules OU "   Neck: Normal range of motion. Neck supple. No thyromegaly present.   Cardiovascular: Normal rate, regular rhythm and normal heart sounds.    Pulmonary/Chest: Effort normal and breath sounds normal. No respiratory distress.   Musculoskeletal: Normal range of motion. He exhibits deformity (Cannot lay flat due to scoliosis. ). He exhibits no edema.   Severe scoliosis noted.    Lymphadenopathy:     He has no cervical adenopathy.   Neurological: He is alert and oriented to person, place, and time. No cranial nerve deficit. Coordination normal. GCS score is 15.   Skin: Skin is warm and dry.   TMTC dermal neurofibromas - many of significant size with pedunculation. Lower extremity with some freckling.  None groin or axillary areas.  Left cheek nevi.    Psychiatric: Mood, memory, affect and judgment normal.       Impression:  1. NF1  2. Scoliosis  3. No current skin concerns except dryness.  4. Current pain regimen providing adequate control  5. HTN in better control. Weight is fairly stable.      Plan:  1. RTC 2 years or prn  2. Ongoing follow up  3. He will speak with dermatology about our NF skin study.

## 2017-12-06 NOTE — NURSING NOTE
"Chief Complaint   Patient presents with     RECHECK     Patient is here today for Neurofibromatosis follow up     /81 (BP Location: Right arm, Patient Position: Fowlers, Cuff Size: Adult Small)  Pulse 83  Temp 98.3  F (36.8  C) (Oral)  Resp 16  Ht 1.478 m (4' 10.19\")  Wt 48.7 kg (107 lb 5.8 oz)  SpO2 99%  BMI 22.29 kg/m2  I spent 10 minutes reviewing medications, allergies, and obtaining vitals.    Kady Lora LPN  December 6, 2017    "

## 2018-01-03 ENCOUNTER — MYC REFILL (OUTPATIENT)
Dept: PEDIATRICS | Facility: CLINIC | Age: 53
End: 2018-01-03

## 2018-01-03 DIAGNOSIS — G89.29 OTHER CHRONIC PAIN: ICD-10-CM

## 2018-01-03 DIAGNOSIS — M41.55 OTHER SECONDARY SCOLIOSIS, THORACOLUMBAR REGION: ICD-10-CM

## 2018-01-03 DIAGNOSIS — Q85.01 NEUROFIBROMATOSIS, TYPE 1 (VON RECKLINGHAUSEN'S DISEASE) (H): ICD-10-CM

## 2018-01-03 NOTE — TELEPHONE ENCOUNTER
Morphine (MS Contin) 15mg      Last Written Prescription Date:  12/11/2017  Last Fill Quantity: 60,   # refills: 0  Last Office Visit: 10/16/2017  Future Office visit:       Routing refill request to provider for review/approval because:  Drug not on the FMG, UMP or  Health refill protocol or controlled substance      RX monitoring program ([place]) reviewed:  reviewed- no concerns    [place] profile:  [email or web address]    Last Filled:    12/11/2017, #60 (TOO EARLY)  11/07/2017, #60 (Robi)  10/08/2017, #60      Pema RAIN RN, BSN, PHN  [facility]

## 2018-01-03 NOTE — TELEPHONE ENCOUNTER
Message from MyChart:  Original authorizing provider: MD Jarrell Courtney would like a refill of the following medications:  morphine (MS CONTIN) 15 MG 12 hr tablet [Shruthi Estrada MD]    Preferred pharmacy: [facility]/PHARMACY #[id number] - [place], [place] - [address]    Comment:  need refill on my morphine will  at  e-mail me when ready, thank you. Jarrell Pepe

## 2018-01-07 ENCOUNTER — MYC REFILL (OUTPATIENT)
Dept: PEDIATRICS | Facility: CLINIC | Age: 53
End: 2018-01-07

## 2018-01-07 DIAGNOSIS — G89.29 OTHER CHRONIC PAIN: ICD-10-CM

## 2018-01-07 DIAGNOSIS — Q85.01 NEUROFIBROMATOSIS, TYPE 1 (VON RECKLINGHAUSEN'S DISEASE) (H): ICD-10-CM

## 2018-01-07 DIAGNOSIS — M41.55 OTHER SECONDARY SCOLIOSIS, THORACOLUMBAR REGION: ICD-10-CM

## 2018-01-07 RX ORDER — MORPHINE SULFATE 15 MG/1
15 TABLET, FILM COATED, EXTENDED RELEASE ORAL EVERY 12 HOURS
Qty: 60 TABLET | Refills: 0 | Status: CANCELLED | OUTPATIENT
Start: 2018-01-07

## 2018-01-08 RX ORDER — MORPHINE SULFATE 15 MG/1
15 TABLET, FILM COATED, EXTENDED RELEASE ORAL EVERY 12 HOURS
Qty: 60 TABLET | Refills: 0 | Status: SHIPPED | OUTPATIENT
Start: 2018-01-08 | End: 2018-02-04

## 2018-01-09 NOTE — TELEPHONE ENCOUNTER
Message from [employer]Chart:  Original authorizing provider: MD Jarrell Courtney would like a refill of the following medications:  morphine (MS CONTIN) 15 MG 12 hr tablet [Shruthi Estrada MD]    Preferred pharmacy: [facility]/PHARMACY #[id number] - [place], [place] - [address]    Comment:  need a refill on my morphine e-mail me when ready, will  at . Thank you Jarrell Morrison

## 2018-01-09 NOTE — TELEPHONE ENCOUNTER
Refill already processed and has been placed at the . See refill encounter. Pt notified.     Latia Ellis RN -- [email or web address]

## 2018-02-06 ENCOUNTER — MYC REFILL (OUTPATIENT)
Dept: PEDIATRICS | Facility: CLINIC | Age: 53
End: 2018-02-06

## 2018-02-06 DIAGNOSIS — M41.55 OTHER SECONDARY SCOLIOSIS, THORACOLUMBAR REGION: ICD-10-CM

## 2018-02-06 DIAGNOSIS — Q85.01 NEUROFIBROMATOSIS, TYPE 1 (VON RECKLINGHAUSEN'S DISEASE) (H): ICD-10-CM

## 2018-02-06 DIAGNOSIS — G89.29 OTHER CHRONIC PAIN: ICD-10-CM

## 2018-02-06 RX ORDER — MORPHINE SULFATE 15 MG/1
15 TABLET, FILM COATED, EXTENDED RELEASE ORAL EVERY 12 HOURS
Qty: 60 TABLET | Refills: 0 | Status: CANCELLED | OUTPATIENT
Start: 2018-02-06

## 2018-02-06 NOTE — TELEPHONE ENCOUNTER
Morphine   Already filled today and addressed in another encounter.  Jolene Guillaume, RN  Message handled by Nurse Triage.

## 2018-02-06 NOTE — TELEPHONE ENCOUNTER
Message from MyChart:  Original authorizing provider: MD Jarrell Courtney would like a refill of the following medications:  morphine (MS CONTIN) 15 MG 12 hr tablet [Shruthi Estrada MD]    Preferred pharmacy: [facility]/PHARMACY #[id number] - [place], [place] - [address]    Comment:  need refill on my morphine email me when ready to  at  thank you Jarrell Pepe

## 2018-03-05 ENCOUNTER — MYC REFILL (OUTPATIENT)
Dept: PEDIATRICS | Facility: CLINIC | Age: 53
End: 2018-03-05

## 2018-03-05 DIAGNOSIS — Q85.01 NEUROFIBROMATOSIS, TYPE 1 (VON RECKLINGHAUSEN'S DISEASE) (H): ICD-10-CM

## 2018-03-05 DIAGNOSIS — G89.29 OTHER CHRONIC PAIN: ICD-10-CM

## 2018-03-05 DIAGNOSIS — M41.55 OTHER SECONDARY SCOLIOSIS, THORACOLUMBAR REGION: ICD-10-CM

## 2018-03-05 RX ORDER — MORPHINE SULFATE 15 MG/1
15 TABLET, FILM COATED, EXTENDED RELEASE ORAL EVERY 12 HOURS
Qty: 60 TABLET | Refills: 0 | Status: SHIPPED | OUTPATIENT
Start: 2018-03-06 | End: 2018-04-03

## 2018-03-05 NOTE — TELEPHONE ENCOUNTER
Refill request for: MS CONTIN 15 MG Q12HRS  Place RX up front. Please reply to this AFARhart message when this has been done.     Last rx written: 2/6/18 # 60    **[place]  reviewed- last filled: 2/8, 1/9, 12/11 - due closer to 3/11, adjusted start date  Narc agreement: #60 for 30 days  Last OV: 10/26/17  for neurofibromatosis    Unable to fill per standing order routed to Dr. Estrada for approval.    Problem List Complete:  Yes    Oliva Tovar RN

## 2018-03-05 NOTE — TELEPHONE ENCOUNTER
Message from MyChart:  Original authorizing provider: MD Jarrell Courtney would like a refill of the following medications:  morphine (MS CONTIN) 15 MG 12 hr tablet [Shruthi Estrada MD]    Preferred pharmacy: [facility]/PHARMACY #[id number] - [place], [place] - [address]    Comment:  need refill on my morphine, e-mail me when ready at  thank you. Jarrell Pepe

## 2018-04-03 ENCOUNTER — MYC REFILL (OUTPATIENT)
Dept: PEDIATRICS | Facility: CLINIC | Age: 53
End: 2018-04-03

## 2018-04-03 DIAGNOSIS — M41.55 OTHER SECONDARY SCOLIOSIS, THORACOLUMBAR REGION: ICD-10-CM

## 2018-04-03 DIAGNOSIS — Q85.01 NEUROFIBROMATOSIS, TYPE 1 (VON RECKLINGHAUSEN'S DISEASE) (H): ICD-10-CM

## 2018-04-03 DIAGNOSIS — G89.29 OTHER CHRONIC PAIN: ICD-10-CM

## 2018-04-03 RX ORDER — MORPHINE SULFATE 15 MG/1
15 TABLET, FILM COATED, EXTENDED RELEASE ORAL EVERY 12 HOURS
Qty: 60 TABLET | Refills: 0 | Status: SHIPPED | OUTPATIENT
Start: 2018-04-03 | End: 2018-05-04

## 2018-04-03 NOTE — TELEPHONE ENCOUNTER
Message from MyChart:  Original authorizing provider: MD Jarrell Courtney would like a refill of the following medications:  morphine (MS CONTIN) 15 MG 12 hr tablet [Shruthi Estrada MD]    Preferred pharmacy: [facility]/PHARMACY #[id number] - [place], [place] - [address]    Comment:  need refill on my morphine e-mail me when ready at  thank you Jarrell Pepe

## 2018-04-03 NOTE — TELEPHONE ENCOUNTER
Please Send Kannuuhart Message when script is down at [facility]    Morphine      Last Written Prescription Date:  3/6/2018  Last Fill Quantity: 60,   # refills: 0  Last Office Visit: 10/26/2017  Future Office visit:    Next 5 appointments (look out 90 days)     Apr 16, 2018  8:00 AM CDT   Iesha Long with Shruthi Estrada MD   [facility] ([facility]    [address] [place] [address]  [address]  [facility] [id number] [phone]                   Routing refill request to provider for review/approval because:  Drug not on the FMG, UMP or  Health refill protocol or controlled substance      RX monitoring program ([place]) reviewed:  reviewed- no concerns (Few Days EARLY)    [place]MP profile:  [email or web address]    Last Filled:  3/7/2018, #60  2/8/2018, #60  1/9/2018, #60     Pema RAIN RN, BSN, PHN  [employer] RN

## 2018-04-16 ENCOUNTER — OFFICE VISIT (OUTPATIENT)
Dept: PEDIATRICS | Facility: CLINIC | Age: 53
End: 2018-04-16
Payer: MEDICARE

## 2018-04-16 VITALS
HEART RATE: 87 BPM | SYSTOLIC BLOOD PRESSURE: 92 MMHG | OXYGEN SATURATION: 97 % | TEMPERATURE: 98.8 F | HEIGHT: 60 IN | DIASTOLIC BLOOD PRESSURE: 60 MMHG | WEIGHT: 102 LBS | BODY MASS INDEX: 20.03 KG/M2

## 2018-04-16 DIAGNOSIS — E55.9 VITAMIN D DEFICIENCY: ICD-10-CM

## 2018-04-16 DIAGNOSIS — Z13.220 LIPID SCREENING: ICD-10-CM

## 2018-04-16 DIAGNOSIS — Z13.6 SCREENING FOR CARDIOVASCULAR CONDITION: ICD-10-CM

## 2018-04-16 DIAGNOSIS — Q85.01 NEUROFIBROMATOSIS, TYPE 1 (H): Primary | ICD-10-CM

## 2018-04-16 DIAGNOSIS — Z11.59 NEED FOR HEPATITIS C SCREENING TEST: ICD-10-CM

## 2018-04-16 DIAGNOSIS — M41.9 SEVERE SCOLIOSIS: ICD-10-CM

## 2018-04-16 LAB
ALBUMIN SERPL-MCNC: 4.5 G/DL (ref 3.4–5)
ALP SERPL-CCNC: 54 U/L (ref 40–150)
ALT SERPL W P-5'-P-CCNC: 20 U/L (ref 0–70)
ANION GAP SERPL CALCULATED.3IONS-SCNC: 5 MMOL/L (ref 3–14)
AST SERPL W P-5'-P-CCNC: 19 U/L (ref 0–45)
BILIRUB SERPL-MCNC: 0.6 MG/DL (ref 0.2–1.3)
BUN SERPL-MCNC: 9 MG/DL (ref 7–30)
CALCIUM SERPL-MCNC: 9.4 MG/DL (ref 8.5–10.1)
CHLORIDE SERPL-SCNC: 106 MMOL/L (ref 94–109)
CHOLEST SERPL-MCNC: 208 MG/DL
CO2 SERPL-SCNC: 29 MMOL/L (ref 20–32)
CREAT SERPL-MCNC: 0.8 MG/DL (ref 0.66–1.25)
GFR SERPL CREATININE-BSD FRML MDRD: >90 ML/MIN/1.7M2
GLUCOSE SERPL-MCNC: 106 MG/DL (ref 70–99)
HDLC SERPL-MCNC: 60 MG/DL
LDLC SERPL CALC-MCNC: 127 MG/DL
NONHDLC SERPL-MCNC: 148 MG/DL
POTASSIUM SERPL-SCNC: 4.1 MMOL/L (ref 3.4–5.3)
PROT SERPL-MCNC: 7.8 G/DL (ref 6.8–8.8)
SODIUM SERPL-SCNC: 140 MMOL/L (ref 133–144)
TRIGL SERPL-MCNC: 104 MG/DL

## 2018-04-16 PROCEDURE — 82306 VITAMIN D 25 HYDROXY: CPT | Performed by: PEDIATRICS

## 2018-04-16 PROCEDURE — G0472 HEP C SCREEN HIGH RISK/OTHER: HCPCS | Performed by: PEDIATRICS

## 2018-04-16 PROCEDURE — 80053 COMPREHEN METABOLIC PANEL: CPT | Performed by: PEDIATRICS

## 2018-04-16 PROCEDURE — 80061 LIPID PANEL: CPT | Performed by: PEDIATRICS

## 2018-04-16 PROCEDURE — 36415 COLL VENOUS BLD VENIPUNCTURE: CPT | Performed by: PEDIATRICS

## 2018-04-16 PROCEDURE — 99214 OFFICE O/P EST MOD 30 MIN: CPT | Performed by: PEDIATRICS

## 2018-04-16 ASSESSMENT — ANXIETY QUESTIONNAIRES
GAD7 TOTAL SCORE: 0
3. WORRYING TOO MUCH ABOUT DIFFERENT THINGS: NOT AT ALL
5. BEING SO RESTLESS THAT IT IS HARD TO SIT STILL: NOT AT ALL
6. BECOMING EASILY ANNOYED OR IRRITABLE: NOT AT ALL
2. NOT BEING ABLE TO STOP OR CONTROL WORRYING: NOT AT ALL
IF YOU CHECKED OFF ANY PROBLEMS ON THIS QUESTIONNAIRE, HOW DIFFICULT HAVE THESE PROBLEMS MADE IT FOR YOU TO DO YOUR WORK, TAKE CARE OF THINGS AT HOME, OR GET ALONG WITH OTHER PEOPLE: NOT DIFFICULT AT ALL
7. FEELING AFRAID AS IF SOMETHING AWFUL MIGHT HAPPEN: NOT AT ALL
1. FEELING NERVOUS, ANXIOUS, OR ON EDGE: NOT AT ALL

## 2018-04-16 ASSESSMENT — PATIENT HEALTH QUESTIONNAIRE - PHQ9: 5. POOR APPETITE OR OVEREATING: NOT AT ALL

## 2018-04-16 NOTE — LETTER
[facility]    04/16/18    Patient: Jarrell Pepe  YOB: 1965  Medical Record Number: [id number]                                                                  Controlled Substance Agreement    MS Contin 15mg twice daily  I understand that my care provider has prescribed controlled substances (narcotics, tranquilizers, and/or stimulants) to help manage my condition(s).  I am taking this medicine to help me function or work.  I know that this is strong medicine.  It could have serious side effects and even cause a dependency on the drug.  If I stop these medicines suddenly, I could have severe withdrawal symptoms.    The risks, benefits, and side effects of these medication(s) were explained to me.  I agree that:  1. I will take part in other treatments as advised by my provider.  This may be psychiatry or counseling, physical therapy, behavioral therapy, group treatment, or a referral to a pain clinic.  I will reduce or stop my medicine when my provider tells me to do so.   2. I will take my medicines as prescribed.  I will not change the dose or schedule unless my provider tells me to.  There will be no refills if I  run out early.   I may be contacted at any time without warning and asked to complete a drug test or pill count.   3. I will keep all my appointments at the clinic.  If I miss appointments or fail to follow instructions, my provider may stop my medicine.  4. I will not ask other providers to prescribe controlled substances. And I will not accept controlled substances from other people. If I need another prescribed controlled substance for a new reason, I will notify my provider within one business day.  5. If I enroll in the [place] Medical Marijuana program, I will tell my provider.  I will also sign an agreement to share my medical records with my provider.  6. I will use one pharmacy to fill all of my controlled substance prescriptions.  If my prescription is mailed  to my pharmacy, it may take 5 to 7 days for my medicine to be ready.  7. I understand that my provider, clinic care team, and pharmacy can track controlled substance prescriptions from other providers through a central database (prescription monitoring program).  8. I will bring in my list of medications (or my medicine bottles) each time I come to the clinic.  REV-  04/2016                                                                                                                                            Page 1 of 2      [facility]    04/16/18    Patient: Jarrell Pepe  YOB: 1965  Medical Record Number: [id number]    9. Refills of controlled substances will be made only during office hours.  It is up to me to make sure that I do not run out of my medicines on weekends or holidays.    10. I am responsible for my prescriptions.  If the medicine is lost or stolen, it will not be replaced.   I also agree not to share these medicines with anyone.  11. I agree to not use ANY illegal or recreational drugs.  This includes marijuana, cocaine, bath salts or other drugs.  I agree not to use alcohol unless my provider says I may.  I agree to give urine samples whenever asked.  If I fail to give a urine sample, the provider may stop my medicine.     12. I will tell my nurse or provider right away if I become pregnant or have a new medical problem treated outside of [facility].  13. I understand that this medicine can affect my thinking and judgment.  It may be unsafe for me to drive, use machinery and do dangerous tasks.  I will not do any of these things until I know how the medicine affects me.  If my dose changes, I will wait to see how it affects me.  I will contact my provider if I have concerns about medicine side effects.  I understand that if I do not follow any of the conditions above, my prescriptions or treatment may be stopped.    I agree that my provider, clinic care team,  and pharmacy may work with any city, state or federal law enforcement agency that investigates the misuse, sale, or other diversion of my controlled medicine. I will allow my provider to discuss my care with or share a copy of this agreement with any other treating provider, pharmacy or emergency room where I receive care.  I agree to give up (waive) any right of privacy or confidentiality with respect to these authorizations.   I have read this agreement and have asked questions about anything I did not understand.   ___________________________________    ___________________________  Patient Signature                                                           Date and Time  ___________________________________     ____________________________  Witness                                                                            Date and Time  ___________________________________  Shruthi Estrada MD  REV-  04/2016                                                                                                                                                                 Page 2 of 2

## 2018-04-16 NOTE — PATIENT INSTRUCTIONS
Fasting labs today    Wonderful to finally meet you!    Let's meet every 6 months to make sure your pain control is adequate

## 2018-04-16 NOTE — PROGRESS NOTES
"  SUBJECTIVE:   Jarrell Pepe is a 52 year old male who presents to clinic today for the following health issues:      Medication Followup of morphine (MS CONTIN) 15 MG 12 hr tablet    Taking Medication as prescribed: yes    Side Effects:  None    Medication Helping Symptoms:  yes       Patient with NF1 - followed at [facility] NF clinic by Dr Reeves and colleagues - recent evaluation with them 12/2017.   Severe scoliosis, multiple neurofibromas that have caused significant chronic pain.  He is well controlled on current dose of MS Contin, which he tolerates well without significant side effect.  Related constipation is well controlled.   Refills have been on time and per clinic rules.    Transitioning care to me today because Dr Torres, his previous PCP, was a resident in our clinic and recently graduated.      Taking vitamin D regularly.    Blood pressures have been well controlled.        Problem list and histories reviewed & adjusted, as indicated.  Additional history: as documented      Reviewed and updated as needed this visit by clinical staff       Reviewed and updated as needed this visit by Provider         ROS:  Constitutional, cv, neuro, msk, psych systems are negative, except as otherwise noted.    OBJECTIVE:     BP 92/60 (BP Location: Right arm, Patient Position: Right side, Cuff Size: Adult Regular)  Pulse 87  Temp 98.8  F (37.1  C) (Tympanic)  Ht 4' 11\" (1.499 m)  Wt 102 lb (46.3 kg)  SpO2 97%  BMI 20.6 kg/m2  Body mass index is 20.6 kg/(m^2).  GENERAL: alert and no distress, incredibly pleasant, here with his wife.  CV: regular rates and rhythm, normal S1 S2, no S3 or S4 and no murmur, click or rub  MS: innumerable dermal neurofibromas over face, arms, severe scoliosis, walks with aid of cane  PSYCH: mentation appears normal, affect normal/bright    Diagnostic Test Results:  pending    ASSESSMENT/PLAN:       ICD-10-CM    1. 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  I will continue to refill.  New controlled substance agreement and its terms reviewed and signed today.   Follow up with me every 6 months, sooner with changes   2. Severe scoliosis M41.9 As above   3. Vitamin D deficiency E55.9 Vitamin D Deficiency  On daily supplementation - check level today   4. Lipid screening Z13.220 Comprehensive metabolic panel     Lipid panel reflex to direct LDL Fasting   5. Need for hepatitis C screening test Z11.59 Hepatitis C Screen Reflex to HCV RNA Quant and Genotype   6. Screening for cardiovascular condition Z13.6 Comprehensive metabolic panel     Lipid panel reflex to direct LDL Fasting       See Patient Instructions    Shruthi Estrada MD  [facility]

## 2018-04-16 NOTE — MR AVS SNAPSHOT
After Visit Summary   4/16/2018    Jarrell Pepe    MRN: [id number]           Patient Information     Date Of Birth          1965        Visit Information        Provider Department      4/16/2018 8:00 AM Shruthi Estrada MD [facility]        Today's Diagnoses     Vitamin D deficiency    -  1    Neurofibromatosis, type 1 (H)        Lipid screening        Need for hepatitis C screening test        Screening for cardiovascular condition          Care Instructions    Fasting labs today    Wonderful to finally meet you!    Let's meet every 6 months to make sure your pain control is adequate          Follow-ups after your visit        Who to contact     If you have questions or need follow up information about today's clinic visit or your schedule please contact [facility] directly at [phone].  Normal or non-critical lab and imaging results will be communicated to you by MyChart, letter or phone within 4 business days after the clinic has received the results. If you do not hear from us within 7 days, please contact the clinic through [employer] or phone. If you have a critical or abnormal lab result, we will notify you by phone as soon as possible.  Submit refill requests through [employer] or call your pharmacy and they will forward the refill request to us. Please allow 3 business days for your refill to be completed.          Additional Information About Your Visit        [employer] Information     [employer] gives you secure access to your electronic health record. If you see a primary care provider, you can also send messages to your care team and make appointments. If you have questions, please call your primary care clinic.  If you do not have a primary care provider, please call [phone] and they will assist you.        Care EveryWhere ID     This is your Care EveryWhere ID. This could be used by other organizations to access your [facility]  "records  [id number]        Your Vitals Were     Pulse Temperature Height Pulse Oximetry BMI (Body Mass Index)       87 98.8  F (37.1  C) (Tympanic) 4' 11\" (1.499 m) 97% 20.6 kg/m2        Blood Pressure from Last 3 Encounters:   04/16/18 92/60   12/06/17 116/81   10/26/17 100/60    Weight from Last 3 Encounters:   04/16/18 102 lb (46.3 kg)   12/06/17 107 lb 5.8 oz (48.7 kg)   10/26/17 105 lb (47.6 kg)              We Performed the Following     Comprehensive metabolic panel     Hepatitis C Screen Reflex to HCV RNA Quant and Genotype     Lipid panel reflex to direct LDL Fasting     Vitamin D Deficiency        Primary Care Provider Office Phone # Fax #    Shruthi Estrada [phone] [address] [place] [address]        Equal Access to Services     [facility]: Hadii aad ku hadasho Soomaali, waaxda luqadaha, qaybta kaalmada adeegyada, waxay wesleyin haygokuln yared simmonsn . So [facility] [phone].    ATENCIÓN: Si habla español, tiene a martinez disposición servicios gratuitos de asistencia lingüística. Brandon al [phone].    We comply with applicable federal civil rights laws and [place] laws. We do not discriminate on the basis of race, color, national origin, age, disability, sex, sexual orientation, or gender identity.            Thank you!     Thank you for choosing [facility]  for your care. Our goal is always to provide you with excellent care. Hearing back from our patients is one way we can continue to improve our services. Please take a few minutes to complete the written survey that you may receive in the mail after your visit with us. Thank you!             Your Updated Medication List - Protect others around you: Learn how to safely use, store and throw away your medicines at www.disposemymeds.org.          This list is accurate as of 4/16/18  8:37 AM.  Always use your most recent med list.                   Brand Name Dispense Instructions for use " Diagnosis    cholecalciferol 1000 UNIT tablet    vitamin D3    100 tablet    Take 1 tablet (1,000 Units) by mouth daily    Vitamin D deficiency       COLACE 100 MG capsule   Generic drug:  docusate sodium      Take 100 mg by mouth daily        morphine 15 MG 12 hr tablet    MS CONTIN    60 tablet    Take 1 tablet (15 mg) by mouth every 12 hours maximum 2 tablet(s) per day.    Neurofibromatosis, type 1 (von Recklinghausen's disease) (H), Other chronic pain, Other secondary scoliosis, thoracolumbar region

## 2018-04-17 LAB
DEPRECATED CALCIDIOL+CALCIFEROL SERPL-MC: 45 UG/L (ref 20–75)
HCV AB SERPL QL IA: NONREACTIVE

## 2018-04-17 ASSESSMENT — PATIENT HEALTH QUESTIONNAIRE - PHQ9: SUM OF ALL RESPONSES TO PHQ QUESTIONS 1-9: 0

## 2018-04-17 ASSESSMENT — ANXIETY QUESTIONNAIRES: GAD7 TOTAL SCORE: 0

## 2018-05-04 ENCOUNTER — MYC REFILL (OUTPATIENT)
Dept: PEDIATRICS | Facility: CLINIC | Age: 53
End: 2018-05-04

## 2018-05-04 DIAGNOSIS — Q85.01 NEUROFIBROMATOSIS, TYPE 1 (VON RECKLINGHAUSEN'S DISEASE) (H): ICD-10-CM

## 2018-05-04 DIAGNOSIS — M41.55 OTHER SECONDARY SCOLIOSIS, THORACOLUMBAR REGION: ICD-10-CM

## 2018-05-04 DIAGNOSIS — G89.29 OTHER CHRONIC PAIN: ICD-10-CM

## 2018-05-04 RX ORDER — MORPHINE SULFATE 15 MG/1
15 TABLET, FILM COATED, EXTENDED RELEASE ORAL EVERY 12 HOURS
Qty: 60 TABLET | Refills: 0 | Status: SHIPPED | OUTPATIENT
Start: 2018-05-04 | End: 2018-06-04

## 2018-05-04 NOTE — TELEPHONE ENCOUNTER
Please note early fill - PCP has reviewed and approved.     Printed, signed, in my COMPLETE box.     RADHA Carrillo MD  Internal Medicine-Pediatrics

## 2018-05-04 NOTE — TELEPHONE ENCOUNTER
Refill request for: MS CONTIN 15 BID  Place RX up front. Please reply to this MyChart message when this has been done.     Last rx written: 4/3/18 # 60    **[place]  reviewed- last filled: 4/5, 3/7, 2/8, 1/9, 12/11 - due closer to 5/10  Narc agreement: #60 for 30 days  Last OV: 4/16/18  for neurofibromatosis    Unable to fill per standing order routed to Dr. Estrada for approval.    Oliva Tovar RN

## 2018-05-04 NOTE — TELEPHONE ENCOUNTER
Message from MyChart:  Original authorizing provider: MD Jarrell Courtney would like a refill of the following medications:  morphine (MS CONTIN) 15 MG 12 hr tablet [Shruthi Estrada MD]    Preferred pharmacy: [facility]/PHARMACY #[id number] - [place], [place] - [address]    Comment:  need refill on my morphine will  at  email me when ready thank you Jarrell Pepe

## 2018-08-03 ENCOUNTER — MYC REFILL (OUTPATIENT)
Dept: PEDIATRICS | Facility: CLINIC | Age: 53
End: 2018-08-03

## 2018-08-03 DIAGNOSIS — G89.29 OTHER CHRONIC PAIN: ICD-10-CM

## 2018-08-03 DIAGNOSIS — Q85.01 NEUROFIBROMATOSIS, TYPE 1 (VON RECKLINGHAUSEN'S DISEASE) (H): ICD-10-CM

## 2018-08-03 DIAGNOSIS — M41.55 OTHER SECONDARY SCOLIOSIS, THORACOLUMBAR REGION: ICD-10-CM

## 2018-08-03 RX ORDER — MORPHINE SULFATE 15 MG/1
15 TABLET, FILM COATED, EXTENDED RELEASE ORAL EVERY 12 HOURS
Qty: 60 TABLET | Refills: 0 | Status: SHIPPED | OUTPATIENT
Start: 2018-08-03 | End: 2018-09-03

## 2018-08-03 NOTE — TELEPHONE ENCOUNTER
Message from MyChart:  Original authorizing provider: MD Jarrell Courtney would like a refill of the following medications:  morphine (MS CONTIN) 15 MG 12 hr tablet [Shruthi Estrada MD]    Preferred pharmacy: [facility]/PHARMACY #[id number] - [place], [place] - [address]    Comment:  need a refill on my morphine e-mail me when ready will pick-up at  thank you Jarrell Pepe

## 2018-08-03 NOTE — TELEPHONE ENCOUNTER
Refill request for: MS CONTIN 15 BID  Place RX up front. Please reply to this MyChart message when this has been done.     Last rx written: 7/3/18 # 60    **[place]  reviewed- last filled: 7/7, 6/7   Narc agreement: #60 for 30 days  Last OV: 4/16/18  for neurofibromatosis    Unable to fill per standing order routed to Dr. Estrada for approval.    Oliva Tovar RN

## 2018-09-03 ENCOUNTER — MYC REFILL (OUTPATIENT)
Dept: PEDIATRICS | Facility: CLINIC | Age: 53
End: 2018-09-03

## 2018-09-03 DIAGNOSIS — Q85.01 NEUROFIBROMATOSIS, TYPE 1 (VON RECKLINGHAUSEN'S DISEASE) (H): ICD-10-CM

## 2018-09-03 DIAGNOSIS — M41.55 OTHER SECONDARY SCOLIOSIS, THORACOLUMBAR REGION: ICD-10-CM

## 2018-09-03 DIAGNOSIS — G89.29 OTHER CHRONIC PAIN: ICD-10-CM

## 2018-09-05 NOTE — TELEPHONE ENCOUNTER
Message from [employer]:  Original authorizing provider: DEEDEE Hernandez CNP would like a refill of the following medications:  morphine (MS CONTIN) 15 MG 12 hr tablet [DEEDEE Hernandez CNP]    Preferred pharmacy: [facility]/PHARMACY #[id number] [place], [place] - [address]    Comment:  need refill on my morphine will  at , e-mail me when ready thank you Jarrell lindo,

## 2018-09-06 ENCOUNTER — MYC REFILL (OUTPATIENT)
Dept: PEDIATRICS | Facility: CLINIC | Age: 53
End: 2018-09-06

## 2018-09-06 DIAGNOSIS — G89.29 OTHER CHRONIC PAIN: ICD-10-CM

## 2018-09-06 DIAGNOSIS — M41.55 OTHER SECONDARY SCOLIOSIS, THORACOLUMBAR REGION: ICD-10-CM

## 2018-09-06 DIAGNOSIS — Q85.01 NEUROFIBROMATOSIS, TYPE 1 (VON RECKLINGHAUSEN'S DISEASE) (H): ICD-10-CM

## 2018-09-06 RX ORDER — MORPHINE SULFATE 15 MG/1
15 TABLET, FILM COATED, EXTENDED RELEASE ORAL EVERY 12 HOURS
Qty: 60 TABLET | Refills: 0 | Status: CANCELLED | OUTPATIENT
Start: 2018-09-06

## 2018-09-06 RX ORDER — MORPHINE SULFATE 15 MG/1
15 TABLET, FILM COATED, EXTENDED RELEASE ORAL EVERY 12 HOURS
Qty: 60 TABLET | Refills: 0 | Status: SHIPPED | OUTPATIENT
Start: 2018-09-06 | End: 2018-10-01

## 2018-09-06 NOTE — TELEPHONE ENCOUNTER
Message from [employer]:  Original authorizing provider: DEEDEE Hernandez CNP would like a refill of the following medications:  morphine (MS CONTIN) 15 MG 12 hr tablet [DEEDEE Hernandez CNP]    Preferred pharmacy: [facility]/PHARMACY #[id number] [facility] [address]    Comment:  need refill on my morphine e-mail me when ready will   Thank you Jarrell lindo

## 2018-09-06 NOTE — TELEPHONE ENCOUNTER
Please leave rx with FD when ready. Thanks.    MS Contin 15 mg bid      Last Written Prescription Date:  8/3/18  Last Fill Quantity: 60,   # refills: 0  Last Office Visit: 4/16/18  Future Office visit:       Routing refill request to provider for review/approval because:  Drug not on the [facility], [facility] or [facility] refill protocol or controlled substance    Abraham, RN  Triage Nurse

## 2018-10-01 ENCOUNTER — MYC REFILL (OUTPATIENT)
Dept: PEDIATRICS | Facility: CLINIC | Age: 53
End: 2018-10-01

## 2018-10-01 DIAGNOSIS — Q85.01 NEUROFIBROMATOSIS, TYPE 1 (VON RECKLINGHAUSEN'S DISEASE) (H): ICD-10-CM

## 2018-10-01 DIAGNOSIS — G89.29 OTHER CHRONIC PAIN: ICD-10-CM

## 2018-10-01 DIAGNOSIS — M41.55 OTHER SECONDARY SCOLIOSIS, THORACOLUMBAR REGION: ICD-10-CM

## 2018-10-02 NOTE — TELEPHONE ENCOUNTER
Message from MyChart:  Original authorizing provider: MD Jarrell Courtney would like a refill of the following medications:  morphine (MS CONTIN) 15 MG 12 hr tablet [Shruthi Estrada MD]    Preferred pharmacy: [facility]/PHARMACY #[id number] - [place], [place] - [address]    Comment:  need refill on my morphine e-mail me when ready to  at  thank you. Jarrell lindo

## 2018-10-03 RX ORDER — MORPHINE SULFATE 15 MG/1
15 TABLET, FILM COATED, EXTENDED RELEASE ORAL EVERY 12 HOURS
Qty: 60 TABLET | Refills: 0 | Status: SHIPPED | OUTPATIENT
Start: 2018-10-03 | End: 2018-11-02

## 2018-10-03 NOTE — TELEPHONE ENCOUNTER
Controlled Substance Refill Request for MS Contin 51 MG  Problem List Complete:  Yes    Last Written Prescription Date:  9/6/18  Last Fill Quantity: 60,   # refills: 0    [place] Office Visit with [facility] [place]: 4/16/18    Clinic visit frequency required: Q 6  months     Future Office visit:     Controlled substance agreement on file: Yes:  Date 4/16/18.     Processing:  Patient will  in clinic   checked in past 3 months?  Yes 10/3/18

## 2018-11-04 ENCOUNTER — MYC REFILL (OUTPATIENT)
Dept: PEDIATRICS | Facility: CLINIC | Age: 53
End: 2018-11-04

## 2018-11-04 DIAGNOSIS — Q85.01 NEUROFIBROMATOSIS, TYPE 1 (VON RECKLINGHAUSEN'S DISEASE) (H): ICD-10-CM

## 2018-11-04 DIAGNOSIS — G89.29 OTHER CHRONIC PAIN: ICD-10-CM

## 2018-11-04 DIAGNOSIS — M41.55 OTHER SECONDARY SCOLIOSIS, THORACOLUMBAR REGION: ICD-10-CM

## 2018-11-04 RX ORDER — MORPHINE SULFATE 15 MG/1
15 TABLET, FILM COATED, EXTENDED RELEASE ORAL EVERY 12 HOURS
Qty: 60 TABLET | Refills: 0 | Status: CANCELLED | OUTPATIENT
Start: 2018-11-04

## 2018-11-05 NOTE — TELEPHONE ENCOUNTER
Message from [employer]:  Original authorizing provider: MD Jarrell Courtney would like a refill of the following medications:  morphine (MS CONTIN) 15 MG 12 hr tablet [Shruhti Estrada MD]    Preferred pharmacy: [facility]/PHARMACY #[id number] - [place], [place] - [address]    Comment:  need refill on my morphine e-mail me when ready, to  at  thank you Jarrell Pepe I have until 11/6/2018 Tuesday then i'm out of morphine

## 2018-11-06 NOTE — TELEPHONE ENCOUNTER
Looks like MD signed the rx today. Duplicate.    Please leave rx with FD when ready. Thanks.    MS Contin 15 mg bid      Last Written Prescription Date:  10/3/18  Last Fill Quantity: 60,   # refills: 0  Last Office Visit: 4/16/18  Future Office visit:       Routing refill request to provider for review/approval because:  Drug not on the [facility], [facility] or [facility] refill protocol or controlled substance    Abraham, RN  Triage Nurse

## 2018-11-29 ENCOUNTER — MYC REFILL (OUTPATIENT)
Dept: PEDIATRICS | Facility: CLINIC | Age: 53
End: 2018-11-29

## 2018-11-29 DIAGNOSIS — G89.29 OTHER CHRONIC PAIN: ICD-10-CM

## 2018-11-29 DIAGNOSIS — M41.55 OTHER SECONDARY SCOLIOSIS, THORACOLUMBAR REGION: ICD-10-CM

## 2018-11-29 DIAGNOSIS — Q85.01 NEUROFIBROMATOSIS, TYPE 1 (VON RECKLINGHAUSEN'S DISEASE) (H): ICD-10-CM

## 2018-11-29 NOTE — TELEPHONE ENCOUNTER
Message from MyChart:  Original authorizing provider: MD Jarrell Courtney would like a refill of the following medications:  morphine (MS CONTIN) 15 MG 12 hr tablet [Shruthi Estrada MD]    Preferred pharmacy: [facility]/PHARMACY #[id number] - [place], [place] - [address]    Comment:  need refill on my morphine, email me when ready will  at . Thank you. Jarrell Pepe

## 2018-11-30 NOTE — TELEPHONE ENCOUNTER
Please leave rx downstairs at the  and notify pt.    Pending Prescriptions:                       Disp   Refills    morphine (MS CONTIN) 15 MG CR tablet      60 tab*0            Sig: Take 1 tablet (15 mg) by mouth every 12 hours           maximum 2 tablet(s) per day.    Last Written Prescription Date:  11/6/18  Last Fill Quantity: 60,  # refills: 0   Last office visit: 4/16/2018 with prescribing provider:     Future Office Visit:       checked.  Last filled 11/6/18.    Routing refill request to provider for review/approval because:  Drug not on the [facility] refill protocol     Day Sellers RN

## 2018-12-03 RX ORDER — MORPHINE SULFATE 15 MG/1
15 TABLET, FILM COATED, EXTENDED RELEASE ORAL EVERY 12 HOURS
Qty: 60 TABLET | Refills: 0 | Status: SHIPPED | OUTPATIENT
Start: 2018-12-03 | End: 2018-12-26

## 2018-12-26 ENCOUNTER — MYC REFILL (OUTPATIENT)
Dept: PEDIATRICS | Facility: CLINIC | Age: 53
End: 2018-12-26

## 2018-12-26 DIAGNOSIS — G89.29 OTHER CHRONIC PAIN: ICD-10-CM

## 2018-12-26 DIAGNOSIS — M41.55 OTHER SECONDARY SCOLIOSIS, THORACOLUMBAR REGION: ICD-10-CM

## 2018-12-26 DIAGNOSIS — Q85.01 NEUROFIBROMATOSIS, TYPE 1 (VON RECKLINGHAUSEN'S DISEASE) (H): ICD-10-CM

## 2018-12-26 RX ORDER — MORPHINE SULFATE 15 MG/1
15 TABLET, FILM COATED, EXTENDED RELEASE ORAL EVERY 12 HOURS
Qty: 60 TABLET | Refills: 0 | Status: SHIPPED | OUTPATIENT
Start: 2018-12-31 | End: 2019-01-29

## 2018-12-26 NOTE — TELEPHONE ENCOUNTER
MS Contin 15 mg      Last Written Prescription Date:  12/3/18  Last Fill Quantity: 60,   # refills: 0  Last Office Visit: 4/16/18  Future Office visit:       Routing refill request to provider for review/approval because:  Drug not on the [facility], [facility] or [facility] refill protocol or controlled substance    Unable to review     Adrianne Vega RN

## 2018-12-26 NOTE — TELEPHONE ENCOUNTER
Script printed, signed, and in station out basket or on MA/LPN/RN desk,  Start date adjusted.    Shruthi Estrada MD  Internal Medicine - Pediatrics

## 2019-01-29 ENCOUNTER — MYC REFILL (OUTPATIENT)
Dept: PEDIATRICS | Facility: CLINIC | Age: 54
End: 2019-01-29

## 2019-01-29 DIAGNOSIS — Q85.01 NEUROFIBROMATOSIS, TYPE 1 (VON RECKLINGHAUSEN'S DISEASE) (H): ICD-10-CM

## 2019-01-29 DIAGNOSIS — M41.55 OTHER SECONDARY SCOLIOSIS, THORACOLUMBAR REGION: ICD-10-CM

## 2019-01-29 DIAGNOSIS — G89.29 OTHER CHRONIC PAIN: ICD-10-CM

## 2019-01-30 RX ORDER — MORPHINE SULFATE 15 MG/1
15 TABLET, FILM COATED, EXTENDED RELEASE ORAL EVERY 12 HOURS
Qty: 60 TABLET | Refills: 0 | Status: SHIPPED | OUTPATIENT
Start: 2019-01-30 | End: 2019-02-27

## 2019-01-30 NOTE — TELEPHONE ENCOUNTER
MS Contin 15 mg bid    Last Written Prescription Date:  12/31/18  Last Fill Quantity: 60,   # refills: 0  Last Office Visit: 4/16/18  Future Office visit:       Routing refill request to provider for review/approval because:  Drug not on the [facility], [facility] or [facility] refill protocol or controlled substance    Abraham, RN  Triage Nurse

## 2019-01-30 NOTE — TELEPHONE ENCOUNTER
Script printed, signed, and in station out basket or on MA/LPN/RN desk    Shruthi Estrada MD  Internal Medicine - Pediatrics

## 2019-02-27 ENCOUNTER — MYC REFILL (OUTPATIENT)
Dept: PEDIATRICS | Facility: CLINIC | Age: 54
End: 2019-02-27

## 2019-02-27 DIAGNOSIS — M41.55 OTHER SECONDARY SCOLIOSIS, THORACOLUMBAR REGION: ICD-10-CM

## 2019-02-27 DIAGNOSIS — G89.29 OTHER CHRONIC PAIN: ICD-10-CM

## 2019-02-27 DIAGNOSIS — Q85.01 NEUROFIBROMATOSIS, TYPE 1 (VON RECKLINGHAUSEN'S DISEASE) (H): ICD-10-CM

## 2019-03-01 RX ORDER — MORPHINE SULFATE 15 MG/1
15 TABLET, FILM COATED, EXTENDED RELEASE ORAL EVERY 12 HOURS
Qty: 60 TABLET | Refills: 0 | Status: SHIPPED | OUTPATIENT
Start: 2019-03-01 | End: 2019-03-27

## 2019-03-01 NOTE — TELEPHONE ENCOUNTER
Printed, signed, in my COMPLETE box.     Please schedule per Dr. Estrada.    RADHA Carrillo MD  Internal Medicine-Pediatrics

## 2019-03-01 NOTE — TELEPHONE ENCOUNTER
OK to fill - forwarded to providers in clinic today.    Also forwarded to station - please help patient schedule follow up.      Shruthi Estrada MD  Internal Medicine - Pediatrics

## 2019-03-01 NOTE — TELEPHONE ENCOUNTER
Please leave rx downstairs at the  and notify pt.     checked-no concerns.    Last office visit 4/16/18.  Plan is as follows:   Return in about 6 months (around 10/16/2018) for pain follow up.     Routing refill request to provider for review/approval because:  Drug not on the [facility] refill protocol.    Day Sellers RN

## 2020-03-02 ENCOUNTER — HEALTH MAINTENANCE LETTER (OUTPATIENT)
Age: 55
End: 2020-03-02

## 2020-12-20 ENCOUNTER — HEALTH MAINTENANCE LETTER (OUTPATIENT)
Age: 55
End: 2020-12-20

## 2021-04-24 ENCOUNTER — HEALTH MAINTENANCE LETTER (OUTPATIENT)
Age: 56
End: 2021-04-24

## 2021-10-03 ENCOUNTER — HEALTH MAINTENANCE LETTER (OUTPATIENT)
Age: 56
End: 2021-10-03

## 2022-05-15 ENCOUNTER — HEALTH MAINTENANCE LETTER (OUTPATIENT)
Age: 57
End: 2022-05-15

## 2022-09-10 ENCOUNTER — HEALTH MAINTENANCE LETTER (OUTPATIENT)
Age: 57
End: 2022-09-10

## 2023-06-03 ENCOUNTER — HEALTH MAINTENANCE LETTER (OUTPATIENT)
Age: 58
End: 2023-06-03